# Patient Record
Sex: MALE | Race: WHITE | NOT HISPANIC OR LATINO | Employment: FULL TIME | URBAN - METROPOLITAN AREA
[De-identification: names, ages, dates, MRNs, and addresses within clinical notes are randomized per-mention and may not be internally consistent; named-entity substitution may affect disease eponyms.]

---

## 2021-11-20 ENCOUNTER — TRANSFERRED RECORDS (OUTPATIENT)
Dept: HEALTH INFORMATION MANAGEMENT | Facility: CLINIC | Age: 46
End: 2021-11-20

## 2021-11-20 ENCOUNTER — HOSPITAL ENCOUNTER (EMERGENCY)
Facility: CLINIC | Age: 46
Discharge: ANOTHER HOSPITAL | End: 2021-11-20
Attending: NURSE PRACTITIONER | Admitting: NURSE PRACTITIONER
Payer: COMMERCIAL

## 2021-11-20 ENCOUNTER — APPOINTMENT (OUTPATIENT)
Dept: CT IMAGING | Facility: CLINIC | Age: 46
End: 2021-11-20
Attending: NURSE PRACTITIONER
Payer: COMMERCIAL

## 2021-11-20 VITALS
RESPIRATION RATE: 28 BRPM | WEIGHT: 315 LBS | SYSTOLIC BLOOD PRESSURE: 137 MMHG | HEIGHT: 70 IN | HEART RATE: 65 BPM | DIASTOLIC BLOOD PRESSURE: 77 MMHG | OXYGEN SATURATION: 93 % | BODY MASS INDEX: 45.1 KG/M2 | TEMPERATURE: 98.9 F

## 2021-11-20 DIAGNOSIS — J12.82 PNEUMONIA DUE TO 2019 NOVEL CORONAVIRUS: ICD-10-CM

## 2021-11-20 DIAGNOSIS — U07.1 PNEUMONIA DUE TO 2019 NOVEL CORONAVIRUS: ICD-10-CM

## 2021-11-20 DIAGNOSIS — J96.01 ACUTE RESPIRATORY FAILURE WITH HYPOXIA (H): ICD-10-CM

## 2021-11-20 LAB
ALBUMIN SERPL-MCNC: 2.6 G/DL (ref 3.4–5)
ALP SERPL-CCNC: 48 U/L (ref 40–150)
ALT SERPL W P-5'-P-CCNC: 56 U/L (ref 0–70)
ANION GAP SERPL CALCULATED.3IONS-SCNC: 4 MMOL/L (ref 3–14)
AST SERPL W P-5'-P-CCNC: 31 U/L (ref 0–45)
BASE EXCESS BLDV CALC-SCNC: 6.5 MMOL/L (ref -7.7–1.9)
BASOPHILS # BLD AUTO: 0 10E3/UL (ref 0–0.2)
BASOPHILS NFR BLD AUTO: 0 %
BILIRUB SERPL-MCNC: 0.4 MG/DL (ref 0.2–1.3)
BUN SERPL-MCNC: 25 MG/DL (ref 7–30)
CALCIUM SERPL-MCNC: 7.7 MG/DL (ref 8.5–10.1)
CHLORIDE BLD-SCNC: 104 MMOL/L (ref 94–109)
CO2 SERPL-SCNC: 29 MMOL/L (ref 20–32)
CREAT SERPL-MCNC: 0.88 MG/DL (ref 0.66–1.25)
CRP SERPL-MCNC: 86 MG/L (ref 0–8)
D DIMER PPP FEU-MCNC: 0.59 UG/ML FEU (ref 0–0.5)
EOSINOPHIL # BLD AUTO: 0 10E3/UL (ref 0–0.7)
EOSINOPHIL NFR BLD AUTO: 0 %
ERYTHROCYTE [DISTWIDTH] IN BLOOD BY AUTOMATED COUNT: 12.4 % (ref 10–15)
FERRITIN SERPL-MCNC: 2571 NG/ML (ref 26–388)
GFR SERPL CREATININE-BSD FRML MDRD: >90 ML/MIN/1.73M2
GLUCOSE BLD-MCNC: 214 MG/DL (ref 70–99)
HCO3 BLDV-SCNC: 32 MMOL/L (ref 21–28)
HCT VFR BLD AUTO: 41.3 % (ref 40–53)
HGB BLD-MCNC: 14 G/DL (ref 13.3–17.7)
IMM GRANULOCYTES # BLD: 0.2 10E3/UL
IMM GRANULOCYTES NFR BLD: 1 %
INR PPP: 1.13 (ref 0.85–1.15)
LACTATE SERPL-SCNC: 2 MMOL/L (ref 0.7–2)
LYMPHOCYTES # BLD AUTO: 1.1 10E3/UL (ref 0.8–5.3)
LYMPHOCYTES NFR BLD AUTO: 7 %
MCH RBC QN AUTO: 30 PG (ref 26.5–33)
MCHC RBC AUTO-ENTMCNC: 33.9 G/DL (ref 31.5–36.5)
MCV RBC AUTO: 89 FL (ref 78–100)
MONOCYTES # BLD AUTO: 0.4 10E3/UL (ref 0–1.3)
MONOCYTES NFR BLD AUTO: 3 %
NEUTROPHILS # BLD AUTO: 15.2 10E3/UL (ref 1.6–8.3)
NEUTROPHILS NFR BLD AUTO: 89 %
NRBC # BLD AUTO: 0 10E3/UL
NRBC BLD AUTO-RTO: 0 /100
O2/TOTAL GAS SETTING VFR VENT: 37 %
PCO2 BLDV: 47 MM HG (ref 40–50)
PH BLDV: 7.44 [PH] (ref 7.32–7.43)
PLATELET # BLD AUTO: 204 10E3/UL (ref 150–450)
PO2 BLDV: 27 MM HG (ref 25–47)
POTASSIUM BLD-SCNC: 4.2 MMOL/L (ref 3.4–5.3)
PROCALCITONIN SERPL-MCNC: 0.08 NG/ML
PROT SERPL-MCNC: 6.5 G/DL (ref 6.8–8.8)
RADIOLOGIST FLAGS: ABNORMAL
RBC # BLD AUTO: 4.66 10E6/UL (ref 4.4–5.9)
SODIUM SERPL-SCNC: 137 MMOL/L (ref 133–144)
TROPONIN I SERPL-MCNC: <0.015 UG/L (ref 0–0.04)
WBC # BLD AUTO: 17 10E3/UL (ref 4–11)

## 2021-11-20 PROCEDURE — 96365 THER/PROPH/DIAG IV INF INIT: CPT | Performed by: NURSE PRACTITIONER

## 2021-11-20 PROCEDURE — 84145 PROCALCITONIN (PCT): CPT | Performed by: NURSE PRACTITIONER

## 2021-11-20 PROCEDURE — 93010 ELECTROCARDIOGRAM REPORT: CPT | Performed by: NURSE PRACTITIONER

## 2021-11-20 PROCEDURE — 36415 COLL VENOUS BLD VENIPUNCTURE: CPT | Performed by: NURSE PRACTITIONER

## 2021-11-20 PROCEDURE — 80053 COMPREHEN METABOLIC PANEL: CPT | Performed by: NURSE PRACTITIONER

## 2021-11-20 PROCEDURE — 250N000013 HC RX MED GY IP 250 OP 250 PS 637: Performed by: NURSE PRACTITIONER

## 2021-11-20 PROCEDURE — 82728 ASSAY OF FERRITIN: CPT | Performed by: NURSE PRACTITIONER

## 2021-11-20 PROCEDURE — 83605 ASSAY OF LACTIC ACID: CPT | Performed by: NURSE PRACTITIONER

## 2021-11-20 PROCEDURE — 82803 BLOOD GASES ANY COMBINATION: CPT | Performed by: NURSE PRACTITIONER

## 2021-11-20 PROCEDURE — 93005 ELECTROCARDIOGRAM TRACING: CPT | Performed by: NURSE PRACTITIONER

## 2021-11-20 PROCEDURE — 99285 EMERGENCY DEPT VISIT HI MDM: CPT | Mod: 25 | Performed by: NURSE PRACTITIONER

## 2021-11-20 PROCEDURE — 250N000011 HC RX IP 250 OP 636: Performed by: NURSE PRACTITIONER

## 2021-11-20 PROCEDURE — 85025 COMPLETE CBC W/AUTO DIFF WBC: CPT | Performed by: NURSE PRACTITIONER

## 2021-11-20 PROCEDURE — 96372 THER/PROPH/DIAG INJ SC/IM: CPT | Performed by: NURSE PRACTITIONER

## 2021-11-20 PROCEDURE — 71275 CT ANGIOGRAPHY CHEST: CPT

## 2021-11-20 PROCEDURE — 86140 C-REACTIVE PROTEIN: CPT | Performed by: NURSE PRACTITIONER

## 2021-11-20 PROCEDURE — 96361 HYDRATE IV INFUSION ADD-ON: CPT | Performed by: NURSE PRACTITIONER

## 2021-11-20 PROCEDURE — 84484 ASSAY OF TROPONIN QUANT: CPT | Performed by: NURSE PRACTITIONER

## 2021-11-20 PROCEDURE — 85379 FIBRIN DEGRADATION QUANT: CPT | Performed by: NURSE PRACTITIONER

## 2021-11-20 PROCEDURE — 85610 PROTHROMBIN TIME: CPT | Performed by: NURSE PRACTITIONER

## 2021-11-20 PROCEDURE — 250N000009 HC RX 250: Performed by: NURSE PRACTITIONER

## 2021-11-20 PROCEDURE — 258N000003 HC RX IP 258 OP 636: Performed by: NURSE PRACTITIONER

## 2021-11-20 RX ORDER — IBUPROFEN 600 MG/1
600 TABLET, FILM COATED ORAL EVERY 4 HOURS PRN
Status: DISCONTINUED | OUTPATIENT
Start: 2021-11-20 | End: 2021-11-20 | Stop reason: HOSPADM

## 2021-11-20 RX ORDER — ACETAMINOPHEN 325 MG/1
650 TABLET ORAL EVERY 4 HOURS PRN
Status: DISCONTINUED | OUTPATIENT
Start: 2021-11-20 | End: 2021-11-20 | Stop reason: HOSPADM

## 2021-11-20 RX ORDER — ACETAMINOPHEN 325 MG/1
650 TABLET ORAL ONCE
Status: COMPLETED | OUTPATIENT
Start: 2021-11-20 | End: 2021-11-20

## 2021-11-20 RX ORDER — DEXAMETHASONE 6 MG/1
6 TABLET ORAL 2 TIMES DAILY
COMMUNITY
Start: 2021-11-17 | End: 2023-04-28

## 2021-11-20 RX ORDER — DEXAMETHASONE SODIUM PHOSPHATE 10 MG/ML
6 INJECTION, SOLUTION INTRAMUSCULAR; INTRAVENOUS EVERY 24 HOURS
Status: DISCONTINUED | OUTPATIENT
Start: 2021-11-21 | End: 2021-11-20 | Stop reason: HOSPADM

## 2021-11-20 RX ORDER — IOPAMIDOL 755 MG/ML
100 INJECTION, SOLUTION INTRAVASCULAR ONCE
Status: COMPLETED | OUTPATIENT
Start: 2021-11-20 | End: 2021-11-20

## 2021-11-20 RX ADMIN — SODIUM CHLORIDE 85 ML: 9 INJECTION, SOLUTION INTRAVENOUS at 14:23

## 2021-11-20 RX ADMIN — IOPAMIDOL 100 ML: 755 INJECTION, SOLUTION INTRAVENOUS at 14:22

## 2021-11-20 RX ADMIN — ACETAMINOPHEN 650 MG: 325 TABLET, FILM COATED ORAL at 13:20

## 2021-11-20 RX ADMIN — SODIUM CHLORIDE 1000 ML: 9 INJECTION, SOLUTION INTRAVENOUS at 13:21

## 2021-11-20 RX ADMIN — REMDESIVIR 200 MG: 100 INJECTION, POWDER, LYOPHILIZED, FOR SOLUTION INTRAVENOUS at 16:44

## 2021-11-20 RX ADMIN — ENOXAPARIN SODIUM 40 MG: 40 INJECTION SUBCUTANEOUS at 16:45

## 2021-11-20 RX ADMIN — SODIUM CHLORIDE 50 ML: 9 INJECTION, SOLUTION INTRAVENOUS at 16:46

## 2021-11-20 ASSESSMENT — ENCOUNTER SYMPTOMS
HEADACHES: 1
MYALGIAS: 1
DIARRHEA: 1
VOMITING: 0
ABDOMINAL PAIN: 0
FEVER: 1
FATIGUE: 1
NAUSEA: 1
CHILLS: 1
APPETITE CHANGE: 1
SHORTNESS OF BREATH: 1
COUGH: 1

## 2021-11-20 NOTE — ED TRIAGE NOTES
Pt was covid positive on 11/16 and was sent home with oxygen at 2 L, he is having increased shortness of breath, was not able to do iv treatment because of oxygen use

## 2021-11-20 NOTE — ED PROVIDER NOTES
History     Chief Complaint   Patient presents with     Shortness of Breath     HPI  Clarence Stahl is a 45 year old male who presents for evaluation of increased shortness of breath.  Patient was seen 11/16/2021 at [place] ED, positive for Covid-19 and CXR showing evidence of covid-pneumonia with hypoxia. He was discharged on dexamethasone 6 mg twice a day for 7 days and oxygen at 2 L per nasal cannula.  Symptoms started on 11/11/2021 (9 days ago). He reports fever/chills, dry cough, congestion, decreased appetite, and intermittent nausea.  Over the last 24 hours he reports SPO2 88-90% despite oxygen at 2L at home, increased shortness of breath, and has been coughing up blood. Today he started having diarrhea.  Nonsmoker.  Drinks alcohol occasionally, none recently.  He is not vaccinated for covid.    Allergies:  No Known Allergies    Problem List:    There are no problems to display for this patient.       Past Medical History:    History reviewed. No pertinent past medical history.    Past Surgical History:    Past Surgical History:   Procedure Laterality Date     ORTHOPEDIC SURGERY         Family History:    History reviewed. No pertinent family history.    Social History:  Marital Status:   [2]  Social History     Tobacco Use     Smoking status: Never Smoker     Smokeless tobacco: Never Used   Substance Use Topics     Alcohol use: Not Currently     Drug use: Never        Medications:    Ascorbic Acid (VITAMIN C PO)  dexamethasone (DECADRON) 6 MG tablet      Review of Systems   Constitutional: Positive for appetite change, chills, fatigue and fever.   HENT: Positive for congestion.    Respiratory: Positive for cough and shortness of breath.    Cardiovascular: Negative for chest pain.   Gastrointestinal: Positive for diarrhea and nausea. Negative for abdominal pain and vomiting.   Genitourinary: Negative.    Musculoskeletal: Positive for myalgias.   Skin: Negative.    Neurological: Positive for  "headaches.   All other systems reviewed and are negative.    Physical Exam   BP: 120/71  Pulse: 84  Temp: 98.9  F (37.2  C)  Resp: 18  Height: 177.8 cm (5' 10\")  Weight: (!) 155.4 kg (342 lb 11.2 oz)  SpO2: (!) 89 %    Physical Exam  Constitutional:       General: He is in acute distress.      Appearance: He is obese. He is ill-appearing.   HENT:      Head: Normocephalic and atraumatic.      Right Ear: External ear normal.      Left Ear: External ear normal.      Nose: Congestion present.      Mouth/Throat:      Mouth: Mucous membranes are moist.   Eyes:      Conjunctiva/sclera: Conjunctivae normal.   Cardiovascular:      Rate and Rhythm: Normal rate and regular rhythm.      Heart sounds: No murmur heard.      Pulmonary:      Effort: Respiratory distress (SPO2 89% on arrival on oxygen 2L/per nasal canula) present.      Breath sounds: Examination of the right-lower field reveals decreased breath sounds. Examination of the left-lower field reveals decreased breath sounds. Decreased breath sounds present.   Musculoskeletal:         General: Normal range of motion.   Skin:     General: Skin is warm and dry.   Neurological:      General: No focal deficit present.      Mental Status: He is alert and oriented to person, place, and time.         ED Course        Procedures               EKG Interpretation:      Interpreted by ARMAND Carson CNP  Time reviewed:1325   Symptoms at time of EKG: short of breath   Rhythm: Normal sinus   Rate: Normal  Axis: Normal  Ectopy: None  Conduction: Normal  ST Segments/ T Waves: No ST-T wave changes and No acute ischemic changes  Q Waves: None  Comparison to prior: No old EKG available    Clinical Impression: normal EKG      Results for orders placed or performed during the hospital encounter of 11/20/21 (from the past 24 hour(s))   Lactic acid whole blood   Result Value Ref Range    Lactic Acid 2.0 0.7 - 2.0 mmol/L   Blood gas venous   Result Value Ref Range    pH Venous 7.44 " (H) 7.32 - 7.43    pCO2 Venous 47 40 - 50 mm Hg    pO2 Venous 27 25 - 47 mm Hg    Bicarbonate Venous 32 (H) 21 - 28 mmol/L    Base Excess/Deficit (+/-) 6.5 (H) -7.7 - 1.9 mmol/L    FIO2 37    CBC with platelets differential    Narrative    The following orders were created for panel order CBC with platelets differential.  Procedure                               Abnormality         Status                     ---------                               -----------         ------                     CBC with platelets and d...[[id number]]  Abnormal            Final result                 Please view results for these tests on the individual orders.   Comprehensive metabolic panel   Result Value Ref Range    Sodium 137 133 - 144 mmol/L    Potassium 4.2 3.4 - 5.3 mmol/L    Chloride 104 94 - 109 mmol/L    Carbon Dioxide (CO2) 29 20 - 32 mmol/L    Anion Gap 4 3 - 14 mmol/L    Urea Nitrogen 25 7 - 30 mg/dL    Creatinine 0.88 0.66 - 1.25 mg/dL    Calcium 7.7 (L) 8.5 - 10.1 mg/dL    Glucose 214 (H) 70 - 99 mg/dL    Alkaline Phosphatase 48 40 - 150 U/L    AST 31 0 - 45 U/L    ALT 56 0 - 70 U/L    Protein Total 6.5 (L) 6.8 - 8.8 g/dL    Albumin 2.6 (L) 3.4 - 5.0 g/dL    Bilirubin Total 0.4 0.2 - 1.3 mg/dL    GFR Estimate >90 >60 mL/min/1.73m2   D dimer quantitative   Result Value Ref Range    D-Dimer Quantitative 0.59 (H) 0.00 - 0.50 ug/mL FEU    Narrative    This D-dimer assay is intended for use in conjunction with a clinical pretest probability assessment model to exclude pulmonary embolism (PE) and deep venous thrombosis (DVT) in outpatients suspected of PE or DVT. The cut-off value is 0.50 ug/mL FEU.   Troponin I   Result Value Ref Range    Troponin I <0.015 0.000 - 0.045 ug/L   CRP inflammation   Result Value Ref Range    CRP Inflammation 86.0 (H) 0.0 - 8.0 mg/L   Ferritin   Result Value Ref Range    Ferritin 2,571 (H) 26 - 388 ng/mL   CBC with platelets and differential   Result Value Ref Range    WBC Count 17.0 (H) 4.0 -  11.0 10e3/uL    RBC Count 4.66 4.40 - 5.90 10e6/uL    Hemoglobin 14.0 13.3 - 17.7 g/dL    Hematocrit 41.3 40.0 - 53.0 %    MCV 89 78 - 100 fL    MCH 30.0 26.5 - 33.0 pg    MCHC 33.9 31.5 - 36.5 g/dL    RDW 12.4 10.0 - 15.0 %    Platelet Count 204 150 - 450 10e3/uL    % Neutrophils 89 %    % Lymphocytes 7 %    % Monocytes 3 %    % Eosinophils 0 %    % Basophils 0 %    % Immature Granulocytes 1 %    NRBCs per 100 WBC 0 <1 /100    Absolute Neutrophils 15.2 (H) 1.6 - 8.3 10e3/uL    Absolute Lymphocytes 1.1 0.8 - 5.3 10e3/uL    Absolute Monocytes 0.4 0.0 - 1.3 10e3/uL    Absolute Eosinophils 0.0 0.0 - 0.7 10e3/uL    Absolute Basophils 0.0 0.0 - 0.2 10e3/uL    Absolute Immature Granulocytes 0.2 (H) <=0.0 10e3/uL    Absolute NRBCs 0.0 10e3/uL   INR   Result Value Ref Range    INR 1.13 0.85 - 1.15   CT Chest Pulmonary Embolism w Contrast   Result Value Ref Range    Radiologist flags (Urgent)      Follow-up imaging in 4-6 weeks to assess the mediastinal lesion and the spleen.    Narrative    EXAM: CT CHEST PULMONARY EMBOLISM W CONTRAST  LOCATION: [facility]  DATE/TIME: 11/20/2021 1:59 PM    INDICATION: dyspnea, hypoxia, positive covid  COMPARISON: None.  TECHNIQUE: CT chest pulmonary angiogram during arterial phase injection of IV contrast. Multiplanar reformats and MIP reconstructions were performed. Dose reduction techniques were used.   CONTRAST: Isovue-370, 80mL    FINDINGS:  ANGIOGRAM CHEST: Pulmonary arteries are normal caliber and negative for central, lobar, or segmental pulmonary emboli. Subsegmental vessels are not well assessed due to motion and arm positioning. Thoracic aorta is negative for dissection.    LUNGS AND PLEURA: There are numerous nodular and more confluent opacities throughout the lungs.     MEDIASTINUM/AXILLAE: There is a 3.9 x 3.4 cm low-attenuation structure in the left subaortic distribution. There is a 12 mm right upper paratracheal lymph node.     CORONARY ARTERY  CALCIFICATION: Mild.    UPPER ABDOMEN: Splenomegaly.     MUSCULOSKELETAL: Normal.        Impression    IMPRESSION:  1.  No significant PE.  2.  Commonly reported imaging features of COVID 19 pneumonia are present. Other processes can have a similar imaging appearance.  3.  Significant enlarged left mediastinal structure possibly representing a lymph node. There is a mildly enlarged right upper paratracheal lymph node. Recommend follow-up imaging in 4-6 weeks.  4.  Splenomegaly is nonspecific. This can be assessed at the time of follow-up.      [Access Center: Follow-up imaging in 4-6 weeks to assess the mediastinal lesion and the spleen. ]    This report will be copied to the [facility] to ensure a provider acknowledges the finding. Access Center is available Monday through Friday 8am-3:30 pm.          Medications   remdesivir (VEKURY) 100 mg in 0.9% NaCl 250 mL intermittent infusion SOLN 100 mg (has no administration in time range)     And   0.9% sodium chloride BOLUS (has no administration in time range)   enoxaparin ANTICOAGULANT (LOVENOX) injection 40 mg (40 mg Subcutaneous Given 11/20/21 1645)   acetaminophen (TYLENOL) tablet 650 mg (has no administration in time range)   ibuprofen (ADVIL/MOTRIN) tablet 600 mg (has no administration in time range)   dexamethasone PF (DECADRON) injection 6 mg (has no administration in time range)   0.9% sodium chloride BOLUS (0 mLs Intravenous Stopped 11/20/21 1727)   acetaminophen (TYLENOL) tablet 650 mg (650 mg Oral Given 11/20/21 1320)   iopamidol (ISOVUE-370) solution 100 mL (100 mLs Intravenous Given 11/20/21 1422)   sodium chloride 0.9 % bag 500mL for CT scan flush use (85 mLs As instructed Given 11/20/21 1423)   remdesivir 200 mg in sodium chloride 0.9 % 250 mL intermittent infusion (0 mg Intravenous Stopped 11/20/21 1727)     Followed by   0.9% sodium chloride BOLUS (0 mLs Intravenous Stopped 11/20/21 1727)       Assessments & Plan (with Medical Decision  Making)   Brief HPI:    45 year old male with covid pneumonia, symptoms started 11 days ago. He is not vaccinated for covid-19. Seen at [place] ED 11/16, he had hypoxia, CXR with evidence of covid pneumonia, and discharged home on oxygen at 2L and prescribed Dexamethasone 6 mg twice a day. Over the last 24 hours he has had increased shortness of breath and SPO2 88% despite supplemental oxygen.    Exam/Lab/Imaging findings:  Body mass index is 49.17 kg/m .    Patient is alert and oriented.  Ill-appearing.  Afebrile.  Normotensive.  No tachycardia.  SPO2 89% on arrival in Triage while on oxygen 2L/per nasal canula.  Lung sounds diminished in bilateral bases.  EKG shows sinus tachycardia, no acute ischemic changes.  Pertinent lab and imaging findings:   WBC 17.0 (likely from being on steroid medication). Lactic acid is normal.   CRP 86.0  D-dimer 0.59  Normal electrolytes, normal kidney function, and normal LFTs.  Troponin is normal  Ferritin 2,571  INR 1.13  VBG obtained with patient on 4L per nasal canula.   Procalcitonin still pending.      ED Course/Procedures:    Patient's oxygen is at 4L per nasal canula with SPO2 91-92%.  No increased oxygen demands at this time.  He is remained vitally stable.  Patient took dexamethasone 6 mg p.o. at home this morning.  Here in the emergency department patient was given IV normal saline 1 L bolus, IV remdesivir 200 mg, and enoxaparin 40 mg subcutaneous.   There were no beds in the [facility] and we have been waiting for an inpatient bed to be available.    1716: Bed became available at [facility].  I spoke with Dr. Still hospitalist at [facility] who agrees to assume care of patient on transfer.      New Prescriptions    No medications on file       Final diagnoses:   Pneumonia due to 2019 novel coronavirus   Acute respiratory failure with hypoxia (H)       11/20/2021   [facility] EMERGENCY DEPT     Hanny,  ARMAND Reece CNP  11/20/21 1855       Leslie Gamino APRN CNP  11/20/21 1851

## 2021-11-26 LAB
ALT SERPL-CCNC: 86 IU/L (ref 8–45)
AST SERPL-CCNC: 20 IU/L (ref 2–40)
CREATININE (EXTERNAL): 1.15 MG/DL (ref 0.72–1.25)
GFR ESTIMATED (EXTERNAL): >60 ML/MIN/1.73M2
GFR ESTIMATED (IF AFRICAN AMERICAN) (EXTERNAL): >60 ML/MIN/1.73M2
GLUCOSE (EXTERNAL): 188 MG/DL (ref 65–100)
POTASSIUM (EXTERNAL): 4.4 MMOL/L (ref 3.5–5)

## 2022-01-09 ENCOUNTER — HEALTH MAINTENANCE LETTER (OUTPATIENT)
Age: 47
End: 2022-01-09

## 2022-11-21 ENCOUNTER — HEALTH MAINTENANCE LETTER (OUTPATIENT)
Age: 47
End: 2022-11-21

## 2023-04-15 ENCOUNTER — HOSPITAL ENCOUNTER (EMERGENCY)
Facility: CLINIC | Age: 48
Discharge: HOME | End: 2023-04-15
Attending: EMERGENCY MEDICINE | Admitting: EMERGENCY MEDICINE
Payer: COMMERCIAL

## 2023-04-15 VITALS
SYSTOLIC BLOOD PRESSURE: 145 MMHG | WEIGHT: 315 LBS | TEMPERATURE: 97.9 F | DIASTOLIC BLOOD PRESSURE: 95 MMHG | BODY MASS INDEX: 45.1 KG/M2 | OXYGEN SATURATION: 96 % | HEIGHT: 70 IN | RESPIRATION RATE: 20 BRPM | HEART RATE: 82 BPM

## 2023-04-15 DIAGNOSIS — J02.9 PHARYNGITIS, UNSPECIFIED ETIOLOGY: ICD-10-CM

## 2023-04-15 LAB
DEPRECATED S PYO AG THROAT QL EIA: NEGATIVE
GROUP A STREP BY PCR: NOT DETECTED

## 2023-04-15 PROCEDURE — 99283 EMERGENCY DEPT VISIT LOW MDM: CPT | Performed by: EMERGENCY MEDICINE

## 2023-04-15 PROCEDURE — 250N000012 HC RX MED GY IP 250 OP 636 PS 637: Performed by: EMERGENCY MEDICINE

## 2023-04-15 PROCEDURE — 87651 STREP A DNA AMP PROBE: CPT | Performed by: EMERGENCY MEDICINE

## 2023-04-15 RX ADMIN — DEXAMETHASONE 10 MG: 2 TABLET ORAL at 19:17

## 2023-04-15 ASSESSMENT — ACTIVITIES OF DAILY LIVING (ADL): ADLS_ACUITY_SCORE: 33

## 2023-04-15 NOTE — ED TRIAGE NOTES
Has had a sore throat since Thursday.      Triage Assessment     Row Name 04/15/23 [id number]       Triage Assessment (Adult)    Airway WDL WDL       Respiratory WDL    Respiratory WDL WDL       Skin Circulation/Temperature WDL    Skin Circulation/Temperature WDL WDL       Cardiac WDL    Cardiac WDL WDL       Peripheral/Neurovascular WDL    Peripheral Neurovascular WDL WDL       Cognitive/Neuro/Behavioral WDL    Cognitive/Neuro/Behavioral WDL WDL

## 2023-04-16 ENCOUNTER — HEALTH MAINTENANCE LETTER (OUTPATIENT)
Age: 48
End: 2023-04-16

## 2023-04-16 NOTE — DISCHARGE INSTRUCTIONS
We have sent a culture to see if this is strep throat.  You will receive a call with a prescription for antibiotics if it is positive.  Otherwise, this is a virus and will go away on its own.  You can help with your symptoms by using ibuprofen, Tylenol, salt water gargles, and eating/drinking colder liquids.

## 2023-04-16 NOTE — ED PROVIDER NOTES
"    History     chief complaint  HPI  Patient is a 47-year-old gentleman who reports no contributory past medical history presenting with day 3 of a sore throat.  Occasionally feels slightly fatigued.  Also occasional slight headache.  His wife saw white spots on his tonsils so he came here.  No cough, rhinorrhea, vomiting, diarrhea, abdominal pain, chest pain, dyspnea.    Review of Systems:  All organ systems below were reviewed and are negative unless indicated in the HPI.    Constitutional  Eyes  ENT  Respiratory  Cardiovascular  Gastrointestinal  Genitourinary  Musculoskeletal  Skin  Neuro    Allergies:  No Known Allergies    Problem List:    There are no problems to display for this patient.       Past Medical History:    History reviewed. No pertinent past medical history.    Past Surgical History:    Past Surgical History:   Procedure Laterality Date     ORTHOPEDIC SURGERY         Family History:    No family history on file.    Medications:    Ascorbic Acid (VITAMIN C PO)  dexamethasone (DECADRON) 6 MG tablet          Physical Exam   BP: (!) 145/95  Pulse: 82  Temp: 97.9  F (36.6  C)  Resp: 20  Height: 177.8 cm (5' 10\")  Weight: (!) 153.4 kg (338 lb 1.6 oz)  SpO2: 96 %     Gen: Vital signs reviewed  Eyes: Sclera white, pupils round  ENT: External ears and nares normal, bilateral pharyngeal tonsillar swelling with exudates.  No peritonsillar abscess visualized.  Bilateral submandibular tender lymphadenopathy.  Card: Appears well-perfused  Resp: No respiratory distress.   Extremities: Without obvious deformity  Skin: Dry  Neuro: Alert.      ED Course        Procedures                Results for orders placed or performed during the hospital encounter of 04/15/23 (from the past 24 hour(s))   Streptococcus A Rapid Screen w/Reflex to PCR    Specimen: Throat; Swab   Result Value Ref Range    Group A Strep antigen Negative Negative       Medications   dexamethasone (DECADRON) tablet 10 mg (has no administration in " time range)         Consultations:  None    Social Determinants of Health:  , manages ADLs    Assessments & Plan (with Medical Decision Making)       I have reviewed the nursing notes.    I have reviewed the findings, diagnosis, plan and need for follow up with the patient.      Medical Decision Making  On arrival, vital signs show mild hypertension.  He has obvious pharyngitis on exam.  Differential includes viral pharyngitis versus streptococcal pharyngitis.  Rapid strep test is negative.  Culture sent.  Given Decadron for symptom relief.  Discussed appropriate return precautions; he will be notified if the culture is positive.  Otherwise I do expect this to resolve on its own.  Discharged home in stable condition.    Final diagnoses:   Pharyngitis, unspecified etiology         Shay Gage M.D.   [facility] Emergency Department     Shay Gage MD  04/15/23 1912

## 2023-04-28 ENCOUNTER — OFFICE VISIT (OUTPATIENT)
Dept: FAMILY MEDICINE | Facility: CLINIC | Age: 48
End: 2023-04-28
Payer: COMMERCIAL

## 2023-04-28 VITALS
SYSTOLIC BLOOD PRESSURE: 132 MMHG | OXYGEN SATURATION: 96 % | HEIGHT: 70 IN | BODY MASS INDEX: 45.1 KG/M2 | TEMPERATURE: 97.8 F | DIASTOLIC BLOOD PRESSURE: 84 MMHG | WEIGHT: 315 LBS | HEART RATE: 86 BPM | RESPIRATION RATE: 18 BRPM

## 2023-04-28 DIAGNOSIS — E11.9 TYPE 2 DIABETES MELLITUS WITHOUT COMPLICATION, WITHOUT LONG-TERM CURRENT USE OF INSULIN (H): ICD-10-CM

## 2023-04-28 DIAGNOSIS — R09.82 POSTNASAL DRIP: ICD-10-CM

## 2023-04-28 DIAGNOSIS — R21 FACIAL RASH: ICD-10-CM

## 2023-04-28 DIAGNOSIS — R07.0 THROAT PAIN: Primary | ICD-10-CM

## 2023-04-28 DIAGNOSIS — H11.433 CONJUNCTIVAL INJECTION, BILATERAL: ICD-10-CM

## 2023-04-28 PROBLEM — G56.23 CUBITAL TUNNEL SYNDROME OF BOTH UPPER EXTREMITIES: Status: ACTIVE | Noted: 2019-10-04

## 2023-04-28 PROBLEM — G56.03 CARPAL TUNNEL SYNDROME, BILATERAL: Status: ACTIVE | Noted: 2019-10-04

## 2023-04-28 PROBLEM — M77.02 MEDIAL EPICONDYLITIS, LEFT: Status: ACTIVE | Noted: 2019-10-04

## 2023-04-28 PROBLEM — M77.01 MEDIAL EPICONDYLITIS OF RIGHT ELBOW: Status: ACTIVE | Noted: 2019-10-04

## 2023-04-28 LAB
BASOPHILS # BLD AUTO: 0 10E3/UL (ref 0–0.2)
BASOPHILS NFR BLD AUTO: 0 %
CRP SERPL-MCNC: 21 MG/L
DEPRECATED S PYO AG THROAT QL EIA: NEGATIVE
EOSINOPHIL # BLD AUTO: 0.1 10E3/UL (ref 0–0.7)
EOSINOPHIL NFR BLD AUTO: 1 %
ERYTHROCYTE [DISTWIDTH] IN BLOOD BY AUTOMATED COUNT: 11.7 % (ref 10–15)
ERYTHROCYTE [SEDIMENTATION RATE] IN BLOOD BY WESTERGREN METHOD: 7 MM/HR (ref 0–15)
GROUP A STREP BY PCR: NOT DETECTED
HCT VFR BLD AUTO: 45.7 % (ref 40–53)
HGB BLD-MCNC: 15.8 G/DL (ref 13.3–17.7)
IMM GRANULOCYTES # BLD: 0 10E3/UL
IMM GRANULOCYTES NFR BLD: 0 %
LYMPHOCYTES # BLD AUTO: 1.7 10E3/UL (ref 0.8–5.3)
LYMPHOCYTES NFR BLD AUTO: 18 %
MCH RBC QN AUTO: 29.1 PG (ref 26.5–33)
MCHC RBC AUTO-ENTMCNC: 34.6 G/DL (ref 31.5–36.5)
MCV RBC AUTO: 84 FL (ref 78–100)
MONOCYTES # BLD AUTO: 0.8 10E3/UL (ref 0–1.3)
MONOCYTES NFR BLD AUTO: 9 %
NEUTROPHILS # BLD AUTO: 6.8 10E3/UL (ref 1.6–8.3)
NEUTROPHILS NFR BLD AUTO: 72 %
PLATELET # BLD AUTO: 146 10E3/UL (ref 150–450)
RBC # BLD AUTO: 5.43 10E6/UL (ref 4.4–5.9)
WBC # BLD AUTO: 9.5 10E3/UL (ref 4–11)

## 2023-04-28 PROCEDURE — 85025 COMPLETE CBC W/AUTO DIFF WBC: CPT | Performed by: STUDENT IN AN ORGANIZED HEALTH CARE EDUCATION/TRAINING PROGRAM

## 2023-04-28 PROCEDURE — 99203 OFFICE O/P NEW LOW 30 MIN: CPT | Performed by: STUDENT IN AN ORGANIZED HEALTH CARE EDUCATION/TRAINING PROGRAM

## 2023-04-28 PROCEDURE — 36415 COLL VENOUS BLD VENIPUNCTURE: CPT | Performed by: STUDENT IN AN ORGANIZED HEALTH CARE EDUCATION/TRAINING PROGRAM

## 2023-04-28 PROCEDURE — 85652 RBC SED RATE AUTOMATED: CPT | Performed by: STUDENT IN AN ORGANIZED HEALTH CARE EDUCATION/TRAINING PROGRAM

## 2023-04-28 PROCEDURE — 86140 C-REACTIVE PROTEIN: CPT | Performed by: STUDENT IN AN ORGANIZED HEALTH CARE EDUCATION/TRAINING PROGRAM

## 2023-04-28 PROCEDURE — 87651 STREP A DNA AMP PROBE: CPT | Performed by: STUDENT IN AN ORGANIZED HEALTH CARE EDUCATION/TRAINING PROGRAM

## 2023-04-28 RX ORDER — FLUTICASONE PROPIONATE 50 MCG
1-2 SPRAY, SUSPENSION (ML) NASAL AT BEDTIME
Qty: 15.8 ML | Refills: 3 | Status: SHIPPED | OUTPATIENT
Start: 2023-04-28 | End: 2023-05-02

## 2023-04-28 RX ORDER — OMEGA-3 FATTY ACIDS/FISH OIL 300-1000MG
200 CAPSULE ORAL EVERY 4 HOURS PRN
COMMUNITY
End: 2023-05-02

## 2023-04-28 RX ORDER — TIRZEPATIDE 10 MG/.5ML
INJECTION, SOLUTION SUBCUTANEOUS
COMMUNITY
Start: 2023-04-21

## 2023-04-28 ASSESSMENT — PAIN SCALES - GENERAL: PAINLEVEL: NO PAIN (0)

## 2023-04-28 NOTE — PATIENT INSTRUCTIONS
Try the flonase for the next week. If things aren't significantly better, then  Pepcid (famotidine) over the counter and take 1 tablet once or twice a day.

## 2023-04-28 NOTE — PROGRESS NOTES
Assessment & Plan     Patient is a very pleasant 47-year-old gentleman with past history of type 2 diabetes relatively recently started on Mounjaro who presents today for 3 to 4 weeks of sore throat.  He was seen in urgent care on 4/1, had negative strep testing completed at that time.  He states he had a little bit of improvement in his sore throat, but over the last day or so it has worsened again.  With any This is not associated with any fevers, cough, or other systemic symptoms.  Does not have significant rhinorrhea.  Does feel like his throat is a little bit swollen.  On exam today, he continues to have posterior oropharyngeal erythema with some exudate, tonsils are equal bilaterally with midline uvula.  Does have palpable cervical adenopathy, and no unilateral swelling.  Sore throat is not incredibly severe nor does he have a fever, so I do not have concern for abscess at this time.    For work-up today, we did repeat the strep test.  Rapid strep was negative.  We also obtain inflammatory markers and CBC today, in particular to check the white blood cell count.  Etiology of his sore throat is still unknown.  He does have increased heartburn after starting Mounjaro, see below, as well has some signs of postnasal drip, so either these could certainly be the etiology of his symptoms.  Viral versus bacterial infection not ruled out at this time either.    Throat pain  - Streptococcus A Rapid Screen w/Reflex to PCR - Clinic Collect  - Group A Streptococcus PCR Throat Swab  - CRP inflammation  - Erythrocyte sedimentation rate auto  - CBC with platelets and differential    Conjunctival injection, bilateral  Given some conjunctival injection, CBC completed, especially to check on platelets. Reassuringly, though slightly low, these returned only mildly low and no concerns for ITP at this time.   - CBC with platelets and differential    Facial rash  Erythema with some dryness of central forehead, extending down  "lateral to nose both sides in malar rash distribution. Has been using castor oil which has been helping somewhat. Etiology of this rash is unknown at this time, though could be autoimmune vs allergic. Can continue supportive cares at this time and below workup is ordered.   - CRP inflammation  - Erythrocyte sedimentation rate auto  - CBC with platelets and differential    Postnasal drip  Does have some evidence of postnasal drip so will trial flonase for treatment of throat discomfort.   - fluticasone (FLONASE) 50 MCG/ACT nasal spray  Dispense: 15.8 mL; Refill: 3    Type 2 diabetes mellitus without complication, without long-term current use of insulin (H)  Patient has been taking Mounjaro, started in January. Just increased to the highest dose last week. Considered Mounjaro as possible cause of symptoms, though none are reported symptoms. Does have increased heartburn since starting Mounjaro, which indirectly could be causing his throat discomfort. We discussed trial of Pepcid if Flonase as above doesn't resolve symptoms.     I spent a total of 40 minutes on the day of the visit.   Time spent by me doing chart review, history and exam, documentation and further activities per the note     BMI:   Estimated body mass index is 47.78 kg/m  as calculated from the following:    Height as of this encounter: 1.778 m (5' 10\").    Weight as of this encounter: 151 kg (333 lb).     Felicia Ochoa MD  [facility]    Meghan Lima is a 47 year old, presenting for the following health issues:  Chief Complaint   Patient presents with     Pharyngitis     Derm Problem     On forehead, nose     History of Present Illness       Reason for visit:  Sore throat  Symptom onset:  3-4 weeks ago  Symptom intensity:  Moderate  Symptom progression:  Worsening  Had these symptoms before:  No  What makes it worse:  No  What makes it better:  Cold    He eats 2-3 servings of fruits and vegetables daily.He consumes " "0 sweetened beverage(s) daily.He exercises with enough effort to increase his heart rate 9 or less minutes per day.  He exercises with enough effort to increase his heart rate 3 or less days per week.   He is taking medications regularly.     Sore throat about a month ago.   Used cough drops  At least 3 weeks since  visit  Had white lumps on back of throat.   Strep test negative.   Was given steroid at  - does feel like it helped.     Using cough drops.     Had some red blotches on cheeks and up onto forehead. Feels dry yet on forehead. Wife is having him put castor oil on it the last few nights. Was pretty red previously. This rash about the same time frame.     Seemed like it was getting better but it is back now.   No fevers, chills.  Yesterday a little increased phlegm, no cough.   Does get headaches.   Not a ton of sinus pressure/pain.   For over a year since covid (had it bad), has had issues with breathing through nose.     Nobody else been sick at home. Some people at work have had similar symptoms. No known positives for strep/covid.     No personal history of seasonal allergies.     Mom might have psoriasis.     Does have some issues with heartburn. Takes tums PRN. Since starting mounjaro, when has a beer or two on weekends, that causes heartburn.       Review of Systems   Constitutional, HEENT, cardiovascular, pulmonary, GI, , musculoskeletal, neuro, skin, endocrine and psych systems are negative, except as otherwise noted.      Objective    /84 (BP Location: Right arm, Patient Position: Sitting, Cuff Size: Adult Large)   Pulse 86   Temp 97.8  F (36.6  C) (Tympanic)   Resp 18   Ht 1.778 m (5' 10\")   Wt (!) 151 kg (333 lb)   SpO2 96%   BMI 47.78 kg/m    Body mass index is 47.78 kg/m .  Physical Exam  Constitutional:       Appearance: Normal appearance.   HENT:      Head: Normocephalic.        Right Ear: A middle ear effusion is present.      Left Ear: A middle ear effusion is present.    "   Nose: Congestion present.      Mouth/Throat:      Pharynx: Uvula midline. Oropharyngeal exudate and posterior oropharyngeal erythema present.      Tonsils: No tonsillar abscesses. 1+ on the right. 1+ on the left.   Eyes:      General: No scleral icterus.     Extraocular Movements: Extraocular movements intact.      Conjunctiva/sclera: Conjunctivae normal.   Cardiovascular:      Rate and Rhythm: Normal rate.   Pulmonary:      Effort: Pulmonary effort is normal.   Musculoskeletal:         General: Normal range of motion.      Cervical back: Normal range of motion.   Lymphadenopathy:      Cervical: Cervical adenopathy present.   Neurological:      General: No focal deficit present.      Mental Status: He is alert and oriented to person, place, and time.         Results from this visit  Results for orders placed or performed in visit on 04/28/23   CBC with platelets and differential     Status: Abnormal   Result Value Ref Range    WBC Count 9.5 4.0 - 11.0 10e3/uL    RBC Count 5.43 4.40 - 5.90 10e6/uL    Hemoglobin 15.8 13.3 - 17.7 g/dL    Hematocrit 45.7 40.0 - 53.0 %    MCV 84 78 - 100 fL    MCH 29.1 26.5 - 33.0 pg    MCHC 34.6 31.5 - 36.5 g/dL    RDW 11.7 10.0 - 15.0 %    Platelet Count 146 (L) 150 - 450 10e3/uL    % Neutrophils 72 %    % Lymphocytes 18 %    % Monocytes 9 %    % Eosinophils 1 %    % Basophils 0 %    % Immature Granulocytes 0 %    Absolute Neutrophils 6.8 1.6 - 8.3 10e3/uL    Absolute Lymphocytes 1.7 0.8 - 5.3 10e3/uL    Absolute Monocytes 0.8 0.0 - 1.3 10e3/uL    Absolute Eosinophils 0.1 0.0 - 0.7 10e3/uL    Absolute Basophils 0.0 0.0 - 0.2 10e3/uL    Absolute Immature Granulocytes 0.0 <=0.4 10e3/uL   Streptococcus A Rapid Screen w/Reflex to PCR - Clinic Collect     Status: Normal    Specimen: Throat; Swab   Result Value Ref Range    Group A Strep antigen Negative Negative   CBC with platelets and differential     Status: Abnormal    Narrative    The following orders were created for panel order CBC  with platelets and differential.  Procedure                               Abnormality         Status                     ---------                               -----------         ------                     CBC with platelets and d...[[id number]]  Abnormal            Final result                 Please view results for these tests on the individual orders.

## 2023-04-29 ENCOUNTER — MYC MEDICAL ADVICE (OUTPATIENT)
Dept: FAMILY MEDICINE | Facility: CLINIC | Age: 48
End: 2023-04-29
Payer: COMMERCIAL

## 2023-04-29 DIAGNOSIS — N17.9 AKI (ACUTE KIDNEY INJURY) (H): ICD-10-CM

## 2023-04-29 DIAGNOSIS — J01.90 ACUTE BACTERIAL SINUSITIS: Primary | ICD-10-CM

## 2023-04-29 DIAGNOSIS — B96.89 ACUTE BACTERIAL SINUSITIS: Primary | ICD-10-CM

## 2023-05-01 RX ORDER — AMOXICILLIN 875 MG
875 TABLET ORAL 2 TIMES DAILY
Qty: 14 TABLET | Refills: 0 | Status: SHIPPED | OUTPATIENT
Start: 2023-05-01 | End: 2023-05-08

## 2023-05-02 ENCOUNTER — APPOINTMENT (OUTPATIENT)
Dept: CT IMAGING | Facility: CLINIC | Age: 48
End: 2023-05-02
Attending: EMERGENCY MEDICINE
Payer: COMMERCIAL

## 2023-05-02 ENCOUNTER — HOSPITAL ENCOUNTER (EMERGENCY)
Facility: CLINIC | Age: 48
Discharge: HOME | End: 2023-05-02
Attending: EMERGENCY MEDICINE | Admitting: EMERGENCY MEDICINE
Payer: COMMERCIAL

## 2023-05-02 VITALS
OXYGEN SATURATION: 98 % | SYSTOLIC BLOOD PRESSURE: 136 MMHG | BODY MASS INDEX: 48.01 KG/M2 | HEART RATE: 77 BPM | DIASTOLIC BLOOD PRESSURE: 91 MMHG | RESPIRATION RATE: 16 BRPM | WEIGHT: 315 LBS | TEMPERATURE: 98.1 F

## 2023-05-02 DIAGNOSIS — J03.90 ACUTE TONSILLITIS, UNSPECIFIED ETIOLOGY: ICD-10-CM

## 2023-05-02 LAB
ALBUMIN SERPL BCG-MCNC: 4 G/DL (ref 3.5–5.2)
ALP SERPL-CCNC: 87 U/L (ref 40–129)
ALT SERPL W P-5'-P-CCNC: 23 U/L (ref 10–50)
ANION GAP SERPL CALCULATED.3IONS-SCNC: 9 MMOL/L (ref 7–15)
AST SERPL W P-5'-P-CCNC: 13 U/L (ref 10–50)
BASOPHILS # BLD MANUAL: 0 10E3/UL (ref 0–0.2)
BASOPHILS NFR BLD MANUAL: 0 %
BILIRUB SERPL-MCNC: 0.3 MG/DL
BUN SERPL-MCNC: 24.4 MG/DL (ref 6–20)
CALCIUM SERPL-MCNC: 8.9 MG/DL (ref 8.6–10)
CHLORIDE SERPL-SCNC: 100 MMOL/L (ref 98–107)
CREAT SERPL-MCNC: 1.22 MG/DL (ref 0.67–1.17)
DEPRECATED HCO3 PLAS-SCNC: 30 MMOL/L (ref 22–29)
EOSINOPHIL # BLD MANUAL: 0 10E3/UL (ref 0–0.7)
EOSINOPHIL NFR BLD MANUAL: 0 %
ERYTHROCYTE [DISTWIDTH] IN BLOOD BY AUTOMATED COUNT: 11.9 % (ref 10–15)
ERYTHROCYTE [SEDIMENTATION RATE] IN BLOOD BY WESTERGREN METHOD: 9 MM/HR (ref 0–15)
FLUAV RNA SPEC QL NAA+PROBE: NEGATIVE
FLUBV RNA RESP QL NAA+PROBE: NEGATIVE
GFR SERPL CREATININE-BSD FRML MDRD: 74 ML/MIN/1.73M2
GLUCOSE SERPL-MCNC: 93 MG/DL (ref 70–99)
HCT VFR BLD AUTO: 44 % (ref 40–53)
HGB BLD-MCNC: 15.1 G/DL (ref 13.3–17.7)
LYMPHOCYTES # BLD MANUAL: 4.6 10E3/UL (ref 0.8–5.3)
LYMPHOCYTES NFR BLD MANUAL: 43 %
MCH RBC QN AUTO: 29.4 PG (ref 26.5–33)
MCHC RBC AUTO-ENTMCNC: 34.3 G/DL (ref 31.5–36.5)
MCV RBC AUTO: 86 FL (ref 78–100)
MONOCYTES # BLD MANUAL: 0.5 10E3/UL (ref 0–1.3)
MONOCYTES NFR BLD AUTO: NEGATIVE %
MONOCYTES NFR BLD MANUAL: 5 %
NEUTROPHILS # BLD MANUAL: 5.6 10E3/UL (ref 1.6–8.3)
NEUTROPHILS NFR BLD MANUAL: 52 %
PLAT MORPH BLD: ABNORMAL
PLATELET # BLD AUTO: 192 10E3/UL (ref 150–450)
POTASSIUM SERPL-SCNC: 4.4 MMOL/L (ref 3.4–5.3)
PROT SERPL-MCNC: 7 G/DL (ref 6.4–8.3)
RBC # BLD AUTO: 5.13 10E6/UL (ref 4.4–5.9)
RBC MORPH BLD: ABNORMAL
RSV RNA SPEC NAA+PROBE: NEGATIVE
SARS-COV-2 RNA RESP QL NAA+PROBE: NEGATIVE
SODIUM SERPL-SCNC: 139 MMOL/L (ref 136–145)
VARIANT LYMPHS BLD QL SMEAR: PRESENT
WBC # BLD AUTO: 10.8 10E3/UL (ref 4–11)

## 2023-05-02 PROCEDURE — 96375 TX/PRO/DX INJ NEW DRUG ADDON: CPT

## 2023-05-02 PROCEDURE — 258N000003 HC RX IP 258 OP 636: Performed by: EMERGENCY MEDICINE

## 2023-05-02 PROCEDURE — 86308 HETEROPHILE ANTIBODY SCREEN: CPT | Performed by: EMERGENCY MEDICINE

## 2023-05-02 PROCEDURE — 250N000011 HC RX IP 250 OP 636: Performed by: EMERGENCY MEDICINE

## 2023-05-02 PROCEDURE — 85652 RBC SED RATE AUTOMATED: CPT | Performed by: EMERGENCY MEDICINE

## 2023-05-02 PROCEDURE — 85014 HEMATOCRIT: CPT | Performed by: EMERGENCY MEDICINE

## 2023-05-02 PROCEDURE — 70491 CT SOFT TISSUE NECK W/DYE: CPT

## 2023-05-02 PROCEDURE — 96374 THER/PROPH/DIAG INJ IV PUSH: CPT | Mod: 59

## 2023-05-02 PROCEDURE — 85007 BL SMEAR W/DIFF WBC COUNT: CPT | Performed by: EMERGENCY MEDICINE

## 2023-05-02 PROCEDURE — 96361 HYDRATE IV INFUSION ADD-ON: CPT

## 2023-05-02 PROCEDURE — 99285 EMERGENCY DEPT VISIT HI MDM: CPT | Mod: 25 | Performed by: EMERGENCY MEDICINE

## 2023-05-02 PROCEDURE — C9803 HOPD COVID-19 SPEC COLLECT: HCPCS

## 2023-05-02 PROCEDURE — 99285 EMERGENCY DEPT VISIT HI MDM: CPT | Mod: 25,CS

## 2023-05-02 PROCEDURE — 250N000009 HC RX 250: Performed by: EMERGENCY MEDICINE

## 2023-05-02 PROCEDURE — 87637 SARSCOV2&INF A&B&RSV AMP PRB: CPT | Performed by: EMERGENCY MEDICINE

## 2023-05-02 PROCEDURE — 80053 COMPREHEN METABOLIC PANEL: CPT | Performed by: EMERGENCY MEDICINE

## 2023-05-02 PROCEDURE — 36415 COLL VENOUS BLD VENIPUNCTURE: CPT | Performed by: EMERGENCY MEDICINE

## 2023-05-02 RX ORDER — SODIUM CHLORIDE 9 MG/ML
INJECTION, SOLUTION INTRAVENOUS CONTINUOUS
Status: DISCONTINUED | OUTPATIENT
Start: 2023-05-02 | End: 2023-05-02 | Stop reason: HOSPADM

## 2023-05-02 RX ORDER — HYDROMORPHONE HYDROCHLORIDE 1 MG/ML
0.5 INJECTION, SOLUTION INTRAMUSCULAR; INTRAVENOUS; SUBCUTANEOUS
Status: DISCONTINUED | OUTPATIENT
Start: 2023-05-02 | End: 2023-05-02 | Stop reason: HOSPADM

## 2023-05-02 RX ORDER — KETOROLAC TROMETHAMINE 15 MG/ML
15 INJECTION, SOLUTION INTRAMUSCULAR; INTRAVENOUS ONCE
Status: COMPLETED | OUTPATIENT
Start: 2023-05-02 | End: 2023-05-02

## 2023-05-02 RX ORDER — IBUPROFEN 200 MG
800 TABLET ORAL EVERY 4 HOURS PRN
COMMUNITY
End: 2023-06-09

## 2023-05-02 RX ORDER — CLINDAMYCIN HCL 300 MG
300 CAPSULE ORAL 4 TIMES DAILY
Qty: 40 CAPSULE | Refills: 0 | Status: SHIPPED | OUTPATIENT
Start: 2023-05-02 | End: 2023-05-12

## 2023-05-02 RX ORDER — DEXAMETHASONE SODIUM PHOSPHATE 10 MG/ML
6 INJECTION, SOLUTION INTRAMUSCULAR; INTRAVENOUS ONCE
Status: COMPLETED | OUTPATIENT
Start: 2023-05-02 | End: 2023-05-02

## 2023-05-02 RX ORDER — ONDANSETRON 2 MG/ML
4 INJECTION INTRAMUSCULAR; INTRAVENOUS EVERY 30 MIN PRN
Status: DISCONTINUED | OUTPATIENT
Start: 2023-05-02 | End: 2023-05-02 | Stop reason: HOSPADM

## 2023-05-02 RX ORDER — IOPAMIDOL 755 MG/ML
500 INJECTION, SOLUTION INTRAVASCULAR ONCE
Status: COMPLETED | OUTPATIENT
Start: 2023-05-02 | End: 2023-05-02

## 2023-05-02 RX ORDER — OXYCODONE HYDROCHLORIDE 5 MG/1
5 TABLET ORAL EVERY 6 HOURS PRN
Qty: 12 TABLET | Refills: 0 | Status: SHIPPED | OUTPATIENT
Start: 2023-05-02 | End: 2023-05-05

## 2023-05-02 RX ADMIN — SODIUM CHLORIDE 70 ML: 9 INJECTION, SOLUTION INTRAVENOUS at 17:09

## 2023-05-02 RX ADMIN — IOPAMIDOL 80 ML: 755 INJECTION, SOLUTION INTRAVENOUS at 17:09

## 2023-05-02 RX ADMIN — DEXAMETHASONE SODIUM PHOSPHATE 6 MG: 10 INJECTION, SOLUTION INTRAMUSCULAR; INTRAVENOUS at 16:50

## 2023-05-02 RX ADMIN — KETOROLAC TROMETHAMINE 15 MG: 15 INJECTION, SOLUTION INTRAMUSCULAR; INTRAVENOUS at 16:53

## 2023-05-02 RX ADMIN — SODIUM CHLORIDE 1000 ML: 9 INJECTION, SOLUTION INTRAVENOUS at 16:37

## 2023-05-02 RX ADMIN — HYDROMORPHONE HYDROCHLORIDE 0.5 MG: 1 INJECTION, SOLUTION INTRAMUSCULAR; INTRAVENOUS; SUBCUTANEOUS at 16:47

## 2023-05-02 ASSESSMENT — ACTIVITIES OF DAILY LIVING (ADL): ADLS_ACUITY_SCORE: 35

## 2023-05-02 NOTE — MEDICATION SCRIBE - ADMISSION MEDICATION HISTORY
Medication Scribe Admission Medication History    Admission medication history is complete. The information provided in this note is only as accurate as the sources available at the time of the update.    Medication reconciliation/reorder completed by provider prior to medication history? No    Information Source(s): Patient via in-person    Pertinent Information: Patient has taken 3 out of 14 doses of the Amoxicillin so far.    Changes made to PTA medication list:    Added: None    Deleted: Flonase    Changed: None    Medication Affordability:  Not including over the counter (OTC) medications, was there a time in the past 12 months when you did not take your medications as prescribed because of cost?: No    Allergies reviewed with patient and updates made in EHR: yes    Medication History Completed By: IMELDA KIRK 5/2/2023 6:00 PM    Prior to Admission medications    Medication Sig Last Dose Taking? Auth Provider Long Term End Date   amoxicillin (AMOXIL) 875 MG tablet Take 1 tablet (875 mg) by mouth 2 times daily for 7 days 5/2/2023 at am Yes Felicia Ochoa MD  5/8/23   Ascorbic Acid (VITAMIN C PO) Take 1 tablet by mouth daily 5/1/2023 at pm Yes Abstract, Provider     ibuprofen (ADVIL/MOTRIN) 200 MG tablet Take 800 mg by mouth every 4 hours as needed for pain 4/29/2023 at unknown Yes Reported, Patient     MOUNJARO 10 MG/0.5ML pen INJECT 10 MG SUBCUTANEOUSLY ONCE WEEKLY FOR 4 WEEKS 4/28/2023 at patient normally takes on Thursday but forgot and took on Friday Yes Reported, Patient

## 2023-05-02 NOTE — ED PROVIDER NOTES
History     Chief Complaint   Patient presents with     Throat Pain     HPI  Clarence Stahl is a 47 year old male who presents to the ER secondary to sore throat.  The sore throat started about 8 April.  He has been seen a couple of times and has had negative strep and negative strep culture.  He was started on antibiotics 1 day ago.  He was given a dose of steroids here in the emergency department on the 15th.  The pain and discomfort continues.  It is difficult to swallow.  No shortness of breath wheezing stridor or other symptoms.  No chest pain.  No blood in the stool.  He has had some headache and body aches and fatigue.  No history of mononucleosis.  He has had difficulty going to work and sleeping due to pain.  He previously had COVID and was given steroids for that and [place] sugars and at that point after that he had diabetes diagnosed.    Seen here on 4/15    Allergies:  Allergies   Allergen Reactions     No Known Allergies        Problem List:    Patient Active Problem List    Diagnosis Date Noted     Type II diabetes mellitus (H) 11/23/2021     Priority: Medium     Carpal tunnel syndrome, bilateral 10/04/2019     Priority: Medium     Cubital tunnel syndrome of both upper extremities 10/04/2019     Priority: Medium     Medial epicondylitis of right elbow 10/04/2019     Priority: Medium     Medial epicondylitis, left 10/04/2019     Priority: Medium     Osteoarthritis of left knee 10/12/2012     Priority: Medium     Formatting of this note might be different from the original.  S/p ACL repair in 1995, and arthroscopy in 1997 and 2007.       Displacement of lumbar intervertebral disc without myelopathy 04/03/2012     Priority: Medium     Lateral epicondylitis 10/20/2010     Priority: Medium     Formatting of this note might be different from the original.  Bilateral elbows          Past Medical History:    No past medical history on file.    Past Surgical History:    Past Surgical History:   Procedure  Laterality Date     ORTHOPEDIC SURGERY         Family History:    No family history on file.    Social History:  Marital Status:   [2]  Social History     Tobacco Use     Smoking status: Never     Smokeless tobacco: Never   Substance Use Topics     Alcohol use: Not Currently     Drug use: Never        Medications:    amoxicillin (AMOXIL) 875 MG tablet  Ascorbic Acid (VITAMIN C PO)  clindamycin (CLEOCIN) 300 MG capsule  ibuprofen (ADVIL/MOTRIN) 200 MG tablet  MOUNJARO 10 MG/0.5ML pen  oxyCODONE (ROXICODONE) 5 MG tablet          Review of Systems   All other systems reviewed and are negative.      Physical Exam   BP: (!) 134/96  Pulse: 79  Temp: 98.1  F (36.7  C)  Resp: 16  Weight: (!) 151.8 kg (334 lb 9.6 oz)  SpO2: 97 %      Physical Exam  Vitals and nursing note reviewed.   Constitutional:       General: He is not in acute distress.     Appearance: Normal appearance. He is well-developed. He is not diaphoretic.   HENT:      Head: Normocephalic and atraumatic.      Right Ear: External ear normal.      Left Ear: External ear normal.      Nose: Nose normal.      Mouth/Throat:      Pharynx: Posterior oropharyngeal erythema present. No oropharyngeal exudate.      Comments: Uvula midline  Eyes:      General: No scleral icterus.     Extraocular Movements: Extraocular movements intact.      Conjunctiva/sclera: Conjunctivae normal.      Pupils: Pupils are equal, round, and reactive to light.   Cardiovascular:      Rate and Rhythm: Normal rate.   Pulmonary:      Effort: Pulmonary effort is normal.      Breath sounds: Normal breath sounds.   Musculoskeletal:         General: Normal range of motion.      Cervical back: Normal range of motion and neck supple.   Skin:     General: Skin is warm and dry.      Findings: No rash.   Neurological:      General: No focal deficit present.      Mental Status: He is alert and oriented to person, place, and time.   Psychiatric:         Mood and Affect: Mood normal.         ED Course                  Procedures             Results for orders placed or performed during the hospital encounter of 05/02/23 (from the past 24 hour(s))   CBC with platelets differential    Narrative    The following orders were created for panel order CBC with platelets differential.  Procedure                               Abnormality         Status                     ---------                               -----------         ------                     CBC with platelets and d...[[id number]]  Normal              Final result               Manual Differential[[id number]]          Abnormal            Final result                 Please view results for these tests on the individual orders.   Comprehensive metabolic panel   Result Value Ref Range    Sodium 139 136 - 145 mmol/L    Potassium 4.4 3.4 - 5.3 mmol/L    Chloride 100 98 - 107 mmol/L    Carbon Dioxide (CO2) 30 (H) 22 - 29 mmol/L    Anion Gap 9 7 - 15 mmol/L    Urea Nitrogen 24.4 (H) 6.0 - 20.0 mg/dL    Creatinine 1.22 (H) 0.67 - 1.17 mg/dL    Calcium 8.9 8.6 - 10.0 mg/dL    Glucose 93 70 - 99 mg/dL    Alkaline Phosphatase 87 40 - 129 U/L    AST 13 10 - 50 U/L    ALT 23 10 - 50 U/L    Protein Total 7.0 6.4 - 8.3 g/dL    Albumin 4.0 3.5 - 5.2 g/dL    Bilirubin Total 0.3 <=1.2 mg/dL    GFR Estimate 74 >60 mL/min/1.73m2   Erythrocyte sedimentation rate auto   Result Value Ref Range    Erythrocyte Sedimentation Rate 9 0 - 15 mm/hr   Mononucleosis screen   Result Value Ref Range    Mononucleosis Screen Negative Negative   CBC with platelets and differential   Result Value Ref Range    WBC Count 10.8 4.0 - 11.0 10e3/uL    RBC Count 5.13 4.40 - 5.90 10e6/uL    Hemoglobin 15.1 13.3 - 17.7 g/dL    Hematocrit 44.0 40.0 - 53.0 %    MCV 86 78 - 100 fL    MCH 29.4 26.5 - 33.0 pg    MCHC 34.3 31.5 - 36.5 g/dL    RDW 11.9 10.0 - 15.0 %    Platelet Count 192 150 - 450 10e3/uL   Manual Differential   Result Value Ref Range    % Neutrophils 52 %    % Lymphocytes 43 %    % Monocytes 5  %    % Eosinophils 0 %    % Basophils 0 %    Absolute Neutrophils 5.6 1.6 - 8.3 10e3/uL    Absolute Lymphocytes 4.6 0.8 - 5.3 10e3/uL    Absolute Monocytes 0.5 0.0 - 1.3 10e3/uL    Absolute Eosinophils 0.0 0.0 - 0.7 10e3/uL    Absolute Basophils 0.0 0.0 - 0.2 10e3/uL    RBC Morphology Confirmed RBC Indices     Platelet Assessment  Automated Count Confirmed. Platelet morphology is normal.     Automated Count Confirmed. Platelet morphology is normal.    Reactive Lymphocytes Present (A) None Seen   Symptomatic Influenza A/B, RSV, & SARS-CoV2 PCR (COVID-19) Nose    Specimen: Nose; Swab   Result Value Ref Range    Influenza A PCR Negative Negative    Influenza B PCR Negative Negative    RSV PCR Negative Negative    SARS CoV2 PCR Negative Negative    Narrative    Testing was performed using the Xpert Xpress CoV2/Flu/RSV Assay on the [employer] GeneXpert Instrument. This test should be ordered for the detection of SARS-CoV-2, influenza, and RSV viruses in individuals who meet clinical and/or epidemiological criteria. Test performance is unknown in asymptomatic patients. This test is for in vitro diagnostic use under the FDA EUA for laboratories certified under CLIA to perform high or moderate complexity testing. This test has not been FDA cleared or approved. A negative result does not rule out the presence of PCR inhibitors in the specimen or target RNA in concentration below the limit of detection for the assay. If only one viral target is positive but coinfection with multiple targets is suspected, the sample should be re-tested with another FDA cleared, approved, or authorized test, if coinfection would change clinical management. This test was validated by the [facility]. These laboratories are certified under the Clinical Laboratory Improvement Amendments of 1988 (CLIA-88) as qualified to perform high complexity laboratory testing.   Soft tissue neck CT w contrast    Narrative    EXAM: CT SOFT TISSUE  NECK W CONTRAST  LOCATION: [facility]  DATE/TIME: 5/2/2023 5:19 PM CDT    INDICATION: Sore throat  COMPARISON: None.  CONTRAST: Isovue 370 80 mL  TECHNIQUE: Routine CT Soft Tissue Neck with IV contrast. Multiplanar reformats. Dose reduction techniques were used.    FINDINGS:     MUCOSAL SPACES/SOFT TISSUES: Mild striated hyperenhancement of the palatine tonsils. Normal vocal cords and infraglottic trachea. Normal parapharyngeal space and subcutaneous soft tissues. Unremarkable oral cavity,  spaces, and floor of mouth   structures, allowing for beam hardening from dental amalgam.    LYMPH NODES: Small, likely reactive cervical lymph nodes.     SALIVARY GLANDS: Normal parotid and submandibular glands.    THYROID: Normal.     VESSELS: Vascular structures of the neck are patent.    VISUALIZED INTRACRANIAL/ORBITS/SINUSES: No abnormality of the visualized intracranial compartment or orbits. Visualized mastoid air cells are clear. No significant paranasal sinus mucosal thickening.     OTHER: No destructive osseous lesion. The included lung apices are clear.      Impression    IMPRESSION:   1.  Acute palatine tonsillitis without abscess formation.       Medications   0.9% sodium chloride BOLUS (0 mLs Intravenous Stopped 5/2/23 1824)     Followed by   sodium chloride 0.9% infusion (has no administration in time range)   HYDROmorphone (PF) (DILAUDID) injection 0.5 mg (0.5 mg Intravenous $Given 5/2/23 1647)   ondansetron (ZOFRAN) injection 4 mg (has no administration in time range)   ketorolac (TORADOL) injection 15 mg (15 mg Intravenous $Given 5/2/23 1653)   dexamethasone PF (DECADRON) injection 6 mg (6 mg Intravenous $Given 5/2/23 1650)   iopamidol (ISOVUE-370) solution 500 mL (80 mLs Intravenous $Given 5/2/23 1709)   sodium chloride 0.9 % bag 100mL for CT scan flush use (70 mLs Intravenous $Given 5/2/23 1709)       Assessments & Plan (with Medical Decision Making)    47-year-old  male with a sore throat..  He has had 2 negative strep swabs and cultures.  Antibiotics started today.  Steroids given initially without improvement.  He had difficulty sleeping and no improvement in his symptoms.  He feels like it is getting worse.  There is no fever, hot potato voice, drooling, stridor, distress.  He is not tripoding.  IV established, IV fluids, Zofran, Dilaudid, Toradol, Decadron given.  CT scan of the soft tissue neck ordered along with labs including a CBC CMP sed rate COVID swab.  I did not repeat a strep swab today.  Labs are for the most part reassuring.  Sed rate is normal.  White blood cell count is normal.  There are a few reactive lymphocytes.  This could be viral tonsillitis versus bacterial.  2 swabs with cultures were negative for bacterial etiology.  The patient does not have a fever.  He is already been started on amoxicillin.  Because of his diabetes and previous adverse association with steroids we only give Decadron here but will not give that for home.  He had some improvement with the oxycodone and Toradol here.  I recommended avoiding NSAIDs chronically secondary to his renal insufficiency and diabetes.  I think mostly his renal function is related to dehydration from sore throat not drinking enough water.  I recommended pushing p.o. fluids, oxycodone, switching antibiotic to clindamycin for broader coverage, ENT follow-up if no improvement.  Return to ER precautions and fall precautions discussed.  Referral was placed to ENT.  All questions answered prior to discharge.     I have reviewed the nursing notes.    I have reviewed the findings, diagnosis, plan and need for follow up with the patient.          Medical Decision Making  The patient's presentation was of moderate complexity (an acute illness with systemic symptoms).    The patient's evaluation involved:  review of external note(s) from 2 sources (see separate area of note for details)  ordering and/or review of 3+  test(s) in this encounter (see separate area of note for details)  review of 3+ test result(s) ordered prior to this encounter (see separate area of note for details)    The patient's management necessitated moderate risk (prescription drug management including medications given in the ED).        New Prescriptions    CLINDAMYCIN (CLEOCIN) 300 MG CAPSULE    Take 1 capsule (300 mg) by mouth 4 times daily for 10 days    OXYCODONE (ROXICODONE) 5 MG TABLET    Take 1 tablet (5 mg) by mouth every 6 hours as needed for pain       Final diagnoses:   Acute tonsillitis, unspecified etiology       5/2/2023   [facility] EMERGENCY DEPT     Umer Denis MD  05/02/23 [id number]

## 2023-05-02 NOTE — DISCHARGE INSTRUCTIONS
Stop the amoxicillin  Take the oxycodone and clindamycin as directed  Do not drive if taking the oxycodone  Make an appointment with ENT in case you do not get better.   Return to the ER if new or worsening symptoms.  It was a pleasure to meet you and see you today.  I hope you get better quickly.  Follow-up within a week to repeat your kidney function test.  They were a little off today.  Avoid NSAIDs.  Take Tylenol for headache and body aches or the oxycodone.  Drink plenty of water.

## 2023-05-02 NOTE — ED TRIAGE NOTES
Pt presents with throat pain and difficulty swallowing for one month. Pt  Was here 3 weeks ago strept negative. Pt did got to [facility] Friday for similar and strept negative. Pt was started on amoxicillin yesterday with no relief. Pt 's MD thinks could be abcess.      Triage Assessment     Row Name 05/02/23 1502       Triage Assessment (Adult)    Airway WDL WDL       Respiratory WDL    Respiratory WDL WDL       Skin Circulation/Temperature WDL    Skin Circulation/Temperature WDL WDL       Cardiac WDL    Cardiac WDL WDL       Peripheral/Neurovascular WDL    Peripheral Neurovascular WDL WDL       Cognitive/Neuro/Behavioral WDL    Cognitive/Neuro/Behavioral WDL WDL

## 2023-05-04 NOTE — TELEPHONE ENCOUNTER
Pls see [facility] message from 4/29/23 (patient responded on 5/4/23).    Order pended and message routed to provider for consideration.     Julie Behrendt RN

## 2023-05-12 ENCOUNTER — LAB (OUTPATIENT)
Dept: LAB | Facility: CLINIC | Age: 48
End: 2023-05-12
Payer: COMMERCIAL

## 2023-05-12 DIAGNOSIS — N17.9 AKI (ACUTE KIDNEY INJURY) (H): ICD-10-CM

## 2023-05-12 LAB
ANION GAP SERPL CALCULATED.3IONS-SCNC: 7 MMOL/L (ref 7–15)
BUN SERPL-MCNC: 21.9 MG/DL (ref 6–20)
CALCIUM SERPL-MCNC: 9 MG/DL (ref 8.6–10)
CHLORIDE SERPL-SCNC: 106 MMOL/L (ref 98–107)
CREAT SERPL-MCNC: 1.18 MG/DL (ref 0.67–1.17)
DEPRECATED HCO3 PLAS-SCNC: 29 MMOL/L (ref 22–29)
GFR SERPL CREATININE-BSD FRML MDRD: 77 ML/MIN/1.73M2
GLUCOSE SERPL-MCNC: 119 MG/DL (ref 70–99)
POTASSIUM SERPL-SCNC: 4.4 MMOL/L (ref 3.4–5.3)
SODIUM SERPL-SCNC: 142 MMOL/L (ref 136–145)

## 2023-05-12 PROCEDURE — 36415 COLL VENOUS BLD VENIPUNCTURE: CPT

## 2023-05-12 PROCEDURE — 80048 BASIC METABOLIC PNL TOTAL CA: CPT

## 2023-05-15 ENCOUNTER — MYC MEDICAL ADVICE (OUTPATIENT)
Dept: FAMILY MEDICINE | Facility: CLINIC | Age: 48
End: 2023-05-15
Payer: COMMERCIAL

## 2023-05-15 DIAGNOSIS — N17.9 AKI (ACUTE KIDNEY INJURY) (H): Primary | ICD-10-CM

## 2023-06-02 ENCOUNTER — MYC MEDICAL ADVICE (OUTPATIENT)
Dept: FAMILY MEDICINE | Facility: CLINIC | Age: 48
End: 2023-06-02
Payer: COMMERCIAL

## 2023-06-02 ENCOUNTER — HEALTH MAINTENANCE LETTER (OUTPATIENT)
Age: 48
End: 2023-06-02

## 2023-06-02 DIAGNOSIS — Z13.220 SCREENING FOR LIPID DISORDERS: ICD-10-CM

## 2023-06-02 DIAGNOSIS — E11.9 TYPE 2 DIABETES MELLITUS WITHOUT COMPLICATION, WITHOUT LONG-TERM CURRENT USE OF INSULIN (H): Primary | ICD-10-CM

## 2023-06-02 NOTE — TELEPHONE ENCOUNTER
Pls see [facility] message from 6/2/23.     FLP order pended and message routed to provider for consideration.     Julie Behrendt RN

## 2023-06-09 ENCOUNTER — OFFICE VISIT (OUTPATIENT)
Dept: FAMILY MEDICINE | Facility: CLINIC | Age: 48
End: 2023-06-09
Payer: COMMERCIAL

## 2023-06-09 ENCOUNTER — ANCILLARY PROCEDURE (OUTPATIENT)
Dept: GENERAL RADIOLOGY | Facility: CLINIC | Age: 48
End: 2023-06-09
Attending: STUDENT IN AN ORGANIZED HEALTH CARE EDUCATION/TRAINING PROGRAM
Payer: COMMERCIAL

## 2023-06-09 VITALS
DIASTOLIC BLOOD PRESSURE: 80 MMHG | SYSTOLIC BLOOD PRESSURE: 136 MMHG | HEART RATE: 84 BPM | RESPIRATION RATE: 18 BRPM | TEMPERATURE: 97.9 F | BODY MASS INDEX: 45.1 KG/M2 | WEIGHT: 315 LBS | HEIGHT: 70 IN

## 2023-06-09 DIAGNOSIS — N17.9 AKI (ACUTE KIDNEY INJURY) (H): ICD-10-CM

## 2023-06-09 DIAGNOSIS — E11.9 TYPE 2 DIABETES MELLITUS WITHOUT COMPLICATION, WITHOUT LONG-TERM CURRENT USE OF INSULIN (H): ICD-10-CM

## 2023-06-09 DIAGNOSIS — Z13.220 SCREENING FOR LIPID DISORDERS: ICD-10-CM

## 2023-06-09 DIAGNOSIS — M25.511 CHRONIC RIGHT SHOULDER PAIN: ICD-10-CM

## 2023-06-09 DIAGNOSIS — G89.29 CHRONIC RIGHT SHOULDER PAIN: Primary | ICD-10-CM

## 2023-06-09 DIAGNOSIS — G89.29 CHRONIC RIGHT SHOULDER PAIN: ICD-10-CM

## 2023-06-09 DIAGNOSIS — M25.511 CHRONIC RIGHT SHOULDER PAIN: Primary | ICD-10-CM

## 2023-06-09 DIAGNOSIS — R79.82 ELEVATED C-REACTIVE PROTEIN (CRP): ICD-10-CM

## 2023-06-09 LAB
ANION GAP SERPL CALCULATED.3IONS-SCNC: 12 MMOL/L (ref 7–15)
BUN SERPL-MCNC: 20.1 MG/DL (ref 6–20)
CALCIUM SERPL-MCNC: 8.8 MG/DL (ref 8.6–10)
CHLORIDE SERPL-SCNC: 102 MMOL/L (ref 98–107)
CHOLEST SERPL-MCNC: 153 MG/DL
CREAT SERPL-MCNC: 1.11 MG/DL (ref 0.67–1.17)
CREAT UR-MCNC: 155 MG/DL
CRP SERPL-MCNC: 5.77 MG/L
DEPRECATED HCO3 PLAS-SCNC: 25 MMOL/L (ref 22–29)
GFR SERPL CREATININE-BSD FRML MDRD: 82 ML/MIN/1.73M2
GLUCOSE SERPL-MCNC: 136 MG/DL (ref 70–99)
HBA1C MFR BLD: 5.8 % (ref 0–5.6)
HDLC SERPL-MCNC: 44 MG/DL
LDLC SERPL CALC-MCNC: 88 MG/DL
MICROALBUMIN UR-MCNC: <12 MG/L
MICROALBUMIN/CREAT UR: NORMAL MG/G{CREAT}
NONHDLC SERPL-MCNC: 109 MG/DL
POTASSIUM SERPL-SCNC: 4.1 MMOL/L (ref 3.4–5.3)
SODIUM SERPL-SCNC: 139 MMOL/L (ref 136–145)
TRIGL SERPL-MCNC: 105 MG/DL

## 2023-06-09 PROCEDURE — 86140 C-REACTIVE PROTEIN: CPT | Performed by: STUDENT IN AN ORGANIZED HEALTH CARE EDUCATION/TRAINING PROGRAM

## 2023-06-09 PROCEDURE — 80048 BASIC METABOLIC PNL TOTAL CA: CPT | Performed by: STUDENT IN AN ORGANIZED HEALTH CARE EDUCATION/TRAINING PROGRAM

## 2023-06-09 PROCEDURE — 80061 LIPID PANEL: CPT | Performed by: STUDENT IN AN ORGANIZED HEALTH CARE EDUCATION/TRAINING PROGRAM

## 2023-06-09 PROCEDURE — 73030 X-RAY EXAM OF SHOULDER: CPT | Mod: TC | Performed by: RADIOLOGY

## 2023-06-09 PROCEDURE — 83036 HEMOGLOBIN GLYCOSYLATED A1C: CPT | Performed by: STUDENT IN AN ORGANIZED HEALTH CARE EDUCATION/TRAINING PROGRAM

## 2023-06-09 PROCEDURE — 82043 UR ALBUMIN QUANTITATIVE: CPT | Performed by: STUDENT IN AN ORGANIZED HEALTH CARE EDUCATION/TRAINING PROGRAM

## 2023-06-09 PROCEDURE — 82570 ASSAY OF URINE CREATININE: CPT | Performed by: STUDENT IN AN ORGANIZED HEALTH CARE EDUCATION/TRAINING PROGRAM

## 2023-06-09 PROCEDURE — 36415 COLL VENOUS BLD VENIPUNCTURE: CPT | Performed by: STUDENT IN AN ORGANIZED HEALTH CARE EDUCATION/TRAINING PROGRAM

## 2023-06-09 PROCEDURE — 99214 OFFICE O/P EST MOD 30 MIN: CPT | Performed by: STUDENT IN AN ORGANIZED HEALTH CARE EDUCATION/TRAINING PROGRAM

## 2023-06-09 RX ORDER — ACETAMINOPHEN 500 MG
500-1000 TABLET ORAL EVERY 6 HOURS PRN
COMMUNITY

## 2023-06-09 ASSESSMENT — PAIN SCALES - GENERAL: PAINLEVEL: EXTREME PAIN (8)

## 2023-06-09 NOTE — PROGRESS NOTES
Assessment & Plan     Lyle 47 year old who presents for follow up on renal function testing. Patient was seen multiple times in April and May of this year for ongoing sore throat. Ultimately treated for tonsilitis with clindamycin with improvement. Occasionally with sore throat still.     In the workup in the pas few months he has had mildly elevated CRP with normal ESR, as well as elevated creatinine. He has since stopped taking ibuprofen and stopped Mounjaro, which was started earlier this year. He doesn't remember where he was seen for that and no recent records of A1c check can be seen on chart review. Labs had previously been ordered for follow up and he has not had these drawn yet, so these are drawn today including lipid, BMP, a1c, and a urine alb:creat.     Chronic right shoulder pain  In addition, we discussed chronic right shoulder pain that has worsened in the past 6 months or so after a fall this winter. It is now causing some pain with sleeping on that side. Has previously had rotator cuff injury, he thinks in his 20's, and has had shoulder injections in the past. He does have decreased ROM of the right shoulder compared to the left. Some tenderness to AC joint. We discussed repeat imaging today as on XR in 2021 through [facility] he had a cyst noted, and prior to repeat joint injection would ideally like to see improvement in that. Rechecked XR today. Will either have him RTC here for injection vs referral to sports med pending results.   - XR Shoulder Right G/E 3 Views    Elevated C-reactive protein (CRP)  Recheck CRP today. If remains elevated, consider further rheum workup given facial rash he has previously experienced.   - CRP inflammation    Type 2 diabetes mellitus without complication, without long-term current use of insulin (H)  As above, due for lab check. If A1c is elevated, will need to discuss restarting a medication for treatment. Would preferentially avoid Mounjaro given recent  "experience with it.   - Albumin Random Urine Quantitative with Creat Ratio  - Hemoglobin A1c    Screening for lipid disorders  Due for check  - Lipid panel reflex to direct LDL Fasting    JUANCARLOS (acute kidney injury) (H)  Due for recheck BMP given recent elevation in creatinine.   - Basic metabolic panel    I spent a total of 30 minutes on the day of the visit.   Time spent by me doing chart review, history and exam, documentation and further activities per the note     BMI:   Estimated body mass index is 47.92 kg/m  as calculated from the following:    Height as of this encounter: 1.778 m (5' 10\").    Weight as of this encounter: 151.5 kg (334 lb).     Felicia Ochoa MD  [facility]    Meghan Lima is a 47 year old, presenting for the following health issues:  Chief Complaint   Patient presents with     RECHECK     Lab work, kidney function           6/9/2023    10:11 AM   Additional Questions   Roomed by Aria   Accompanied by Self     History of Present Illness       Reason for visit:  Checkup    He eats 2-3 servings of fruits and vegetables daily.He consumes 1 sweetened beverage(s) daily.He exercises with enough effort to increase his heart rate 9 or less minutes per day.  He exercises with enough effort to increase his heart rate 3 or less days per week.   He is taking medications regularly.     Stopped the ibuprofen. Does take a few tylenol.   Was still getting headaches with sore throat, still getting once in a while.     Stopped the mounjaro to see if that was part of it. Feels better off the Mounjaro.   Mouth not as dry, with alcohol not having issues anymore. Doesn't drink often to begin with.     Still heartburn once in a while.     Was on mounjaro maybe 3 months. Doesn't remember where he was seen for that.     Urinary symptoms - none. No blood, pain.     History of varicose veins. So does get some leg swelling.     Review of Systems   Constitutional, HEENT, " "cardiovascular, pulmonary, GI, , musculoskeletal, neuro, skin, endocrine and psych systems are negative, except as otherwise noted.      Objective    /80 (BP Location: Left arm, Patient Position: Sitting, Cuff Size: Adult Regular)   Pulse 84   Temp 97.9  F (36.6  C) (Tympanic)   Resp 18   Ht 1.778 m (5' 10\")   Wt (!) 151.5 kg (334 lb)   BMI 47.92 kg/m    Body mass index is 47.92 kg/m .  Physical Exam  Constitutional:       Appearance: Normal appearance.   HENT:      Head: Normocephalic.   Eyes:      General: No scleral icterus.     Extraocular Movements: Extraocular movements intact.      Conjunctiva/sclera: Conjunctivae normal.   Cardiovascular:      Rate and Rhythm: Normal rate.   Pulmonary:      Effort: Pulmonary effort is normal.   Musculoskeletal:      Right shoulder: Bony tenderness (over AC joint) present. Decreased range of motion (mildly with abduction, flexion, and internal rotation compared to left.).      Left shoulder: Normal.   Neurological:      General: No focal deficit present.      Mental Status: He is alert and oriented to person, place, and time.         Results from this visitNo results found for any visits on 06/09/23.                "

## 2023-06-16 ENCOUNTER — OFFICE VISIT (OUTPATIENT)
Dept: FAMILY MEDICINE | Facility: CLINIC | Age: 48
End: 2023-06-16
Payer: COMMERCIAL

## 2023-06-16 VITALS
DIASTOLIC BLOOD PRESSURE: 80 MMHG | SYSTOLIC BLOOD PRESSURE: 132 MMHG | OXYGEN SATURATION: 96 % | BODY MASS INDEX: 45.1 KG/M2 | HEIGHT: 70 IN | RESPIRATION RATE: 16 BRPM | WEIGHT: 315 LBS | HEART RATE: 72 BPM

## 2023-06-16 DIAGNOSIS — M19.011 PRIMARY LOCALIZED OSTEOARTHROSIS OF RIGHT SHOULDER REGION: Primary | ICD-10-CM

## 2023-06-16 PROCEDURE — 20610 DRAIN/INJ JOINT/BURSA W/O US: CPT | Mod: RT | Performed by: STUDENT IN AN ORGANIZED HEALTH CARE EDUCATION/TRAINING PROGRAM

## 2023-06-16 RX ORDER — TRIAMCINOLONE ACETONIDE 40 MG/ML
40 INJECTION, SUSPENSION INTRA-ARTICULAR; INTRAMUSCULAR ONCE
Status: COMPLETED | OUTPATIENT
Start: 2023-06-16 | End: 2023-06-16

## 2023-06-16 RX ORDER — LIDOCAINE HYDROCHLORIDE 10 MG/ML
4 INJECTION, SOLUTION INFILTRATION; PERINEURAL ONCE
Status: COMPLETED | OUTPATIENT
Start: 2023-06-16 | End: 2023-06-16

## 2023-06-16 RX ADMIN — TRIAMCINOLONE ACETONIDE 40 MG: 40 INJECTION, SUSPENSION INTRA-ARTICULAR; INTRAMUSCULAR at 13:59

## 2023-06-16 RX ADMIN — LIDOCAINE HYDROCHLORIDE 4 ML: 10 INJECTION, SOLUTION INFILTRATION; PERINEURAL at 13:58

## 2023-06-16 ASSESSMENT — PAIN SCALES - GENERAL: PAINLEVEL: EXTREME PAIN (8)

## 2023-06-16 NOTE — PATIENT INSTRUCTIONS
JOINT INJECTION  What is a joint injection?  A joint injection is a way of putting an anti-inflammatory medicine directly (with a needle) into an inflamed joint.  The medicine is an anti-inflammatory steroid similar to cortisone.  Why do it?  The medicine causes a rapid decrease in the inflammation, and that makes the joint less swollen or painful.  We expect the injection to suppress the inflammation for six to twelve months.  Sometimes it works for a longer period of time, and sometimes for a shorter period of time.    A good response to an injection may let us to avoid starting other medicines or decrease other medicine(s) the child is already taking.   If there is a poor or incomplete response to the first injection, a second injection may be recommended and often works much better.    How is it done?  It can be done with your child awake or asleep.  Children are often scared before their first joint injection.  If this is a concern, a Child Life Specialist can help prepare the child for the procedure, and help with distraction and coaching during the procedure.  For children who cannot do the procedure awake, we can do the injection with sedation at the hospital.  When done awake, we can prepare the skin by applying a numbing cream (EMLA) to the skin about one hour before we start.  The skin over the joint is cleaned carefully with an antiseptic.  A cold spray called ethyl chloride can be sprayed on the skin to numb it more, and then a very small needle is used to inject numbing medicine under the skin.  This numbing medicine is called lidocaine and is similar to what a dentist uses.  We do not numb small joints since we use the same size needle to inject the steroid as we do to inject the lidocaine.  What about side effects?  Side effects are uncommon, but you need to know that:  The joint might become infected.  This would happen in the first two days after the injection.  The joint would become very painful,  red, quite warm and a fever may be present.  This is very rare.  It occurs about once out of 50,000 injections.  The skin near the injection site might become thin (atrophic) or turn white.  This is an effect of steroid leaking back into the skin, and may slowly return to normal over a few years or may be permanent.  Sometimes the medicine will irritate the joint at first and the hurt for several hours before it starts to feel better.  Putting ice on the area, or taking a NSAID (ibuprofen or similar) can help.    What about after the injection?  For the first 24 hours after the injection, avoid soaking the joint.  That means no swimming, hot tubs or baths.  Brief showers are okay.    It is okay to do normal activities but avoid vigorous things like running, biking, football, etc.    You should call if the joint appears red, warm or becomes more painful.    Our nurse or physician can be reached at [phone], or you can call toll-free on weekdays at [facility] ([phone]), or you can reach the on-call physician anytime through the paging  at [phone].

## 2023-06-16 NOTE — PROGRESS NOTES
Injection Note    Clarence Stahl is a patient of Felicia Vazquez here for injection for kenolog injection of the right shoulder.    Consent: Affirmation of informed consent was signed and scanned into the medical record. Risks, benefits and alternatives were discussed. Patient's questions were elicited and answered.   Procedure safety checklist was completed:  Yes  Time Out (Pause for the Cause) completed: Yes    Preoperative Diagnosis:  Primary osteoarthritis right shoulder  Postoperative Diagnosis: same       The right shoulder was prepped  in the usual sterile fashion INJECTION:  Using 4 mL of 1% lidocaine mixed with 40 mg of kenalog, the   was successfully injected without complication.  Patient did experience some pain relief following injection.    Technique:   Skin prep Alcohol  Anesthesia Ethyl choride  Complications:  No  Tolerance:  Pt tolerated procedure well and was in stable condition.     Follow up: Patient was instructed to use ice on the affected area tonight for at least 30 minutes and  that there will be a return to pain in a couple hours followed by relief in the next several days to a week. Patient was advised to call if increasing redness and swelling.     Follow up only if unimproved.    Faculty: Felicia Ochoa MD present for and supervised this entire procedure.            6/16/2023     1:20 PM   Additional Questions   Roomed by Aria   Accompanied by Self     History of Present Illness       Reason for visit:  Shoulder injection  Symptom onset:  More than a month  Symptoms include:  Right shoulder pain  Symptom progression:  Worsening    He eats 2-3 servings of fruits and vegetables daily.He consumes 1 sweetened beverage(s) daily.He exercises with enough effort to increase his heart rate 9 or less minutes per day.  He exercises with enough effort to increase his heart rate 3 or less days per week.   He is taking medications regularly.

## 2023-09-17 ENCOUNTER — HEALTH MAINTENANCE LETTER (OUTPATIENT)
Age: 48
End: 2023-09-17

## 2023-12-04 ENCOUNTER — MYC MEDICAL ADVICE (OUTPATIENT)
Dept: FAMILY MEDICINE | Facility: CLINIC | Age: 48
End: 2023-12-04
Payer: COMMERCIAL

## 2023-12-04 DIAGNOSIS — M19.011 PRIMARY LOCALIZED OSTEOARTHROSIS OF RIGHT SHOULDER REGION: Primary | ICD-10-CM

## 2023-12-11 ENCOUNTER — OFFICE VISIT (OUTPATIENT)
Dept: URGENT CARE | Facility: URGENT CARE | Age: 48
End: 2023-12-11
Payer: COMMERCIAL

## 2023-12-11 ENCOUNTER — ANCILLARY PROCEDURE (OUTPATIENT)
Dept: GENERAL RADIOLOGY | Facility: CLINIC | Age: 48
End: 2023-12-11
Attending: NURSE PRACTITIONER
Payer: COMMERCIAL

## 2023-12-11 VITALS
HEART RATE: 70 BPM | SYSTOLIC BLOOD PRESSURE: 143 MMHG | WEIGHT: 315 LBS | RESPIRATION RATE: 16 BRPM | TEMPERATURE: 97.3 F | DIASTOLIC BLOOD PRESSURE: 86 MMHG | OXYGEN SATURATION: 97 % | BODY MASS INDEX: 50.51 KG/M2

## 2023-12-11 DIAGNOSIS — S49.92XA SHOULDER INJURY, LEFT, INITIAL ENCOUNTER: ICD-10-CM

## 2023-12-11 DIAGNOSIS — M25.512 ACUTE PAIN OF LEFT SHOULDER: Primary | ICD-10-CM

## 2023-12-11 DIAGNOSIS — M25.512 ACUTE PAIN OF LEFT SHOULDER: ICD-10-CM

## 2023-12-11 DIAGNOSIS — R73.03 PRE-DIABETES: ICD-10-CM

## 2023-12-11 LAB
ANION GAP SERPL CALCULATED.3IONS-SCNC: 11 MMOL/L (ref 7–15)
BUN SERPL-MCNC: 16.5 MG/DL (ref 6–20)
CALCIUM SERPL-MCNC: 9.2 MG/DL (ref 8.6–10)
CHLORIDE SERPL-SCNC: 102 MMOL/L (ref 98–107)
CREAT SERPL-MCNC: 1.07 MG/DL (ref 0.67–1.17)
DEPRECATED HCO3 PLAS-SCNC: 27 MMOL/L (ref 22–29)
EGFRCR SERPLBLD CKD-EPI 2021: 86 ML/MIN/1.73M2
GLUCOSE SERPL-MCNC: 117 MG/DL (ref 70–99)
HBA1C MFR BLD: 6.6 % (ref 0–5.6)
POTASSIUM SERPL-SCNC: 4.6 MMOL/L (ref 3.4–5.3)
SODIUM SERPL-SCNC: 140 MMOL/L (ref 135–145)

## 2023-12-11 PROCEDURE — 99214 OFFICE O/P EST MOD 30 MIN: CPT | Performed by: NURSE PRACTITIONER

## 2023-12-11 PROCEDURE — 80048 BASIC METABOLIC PNL TOTAL CA: CPT | Performed by: NURSE PRACTITIONER

## 2023-12-11 PROCEDURE — 83036 HEMOGLOBIN GLYCOSYLATED A1C: CPT | Performed by: NURSE PRACTITIONER

## 2023-12-11 PROCEDURE — 73030 X-RAY EXAM OF SHOULDER: CPT | Mod: TC | Performed by: RADIOLOGY

## 2023-12-11 PROCEDURE — 36415 COLL VENOUS BLD VENIPUNCTURE: CPT | Performed by: NURSE PRACTITIONER

## 2023-12-11 RX ORDER — NAPROXEN 500 MG/1
500 TABLET ORAL 2 TIMES DAILY WITH MEALS
Qty: 40 TABLET | Refills: 0 | Status: SHIPPED | OUTPATIENT
Start: 2023-12-11 | End: 2023-12-31

## 2023-12-11 RX ORDER — PREDNISONE 20 MG/1
40 TABLET ORAL DAILY
Qty: 10 TABLET | Refills: 0 | Status: SHIPPED | OUTPATIENT
Start: 2023-12-11 | End: 2023-12-16

## 2023-12-11 NOTE — PROGRESS NOTES
SUBJECTIVE:   Clarence Stahl is a 48 year old male who presents to the clinic today complaining of shoulder pain on the left side.    Onset of injury or pain was 1 day(s) ago and was sudden onset. The pain is burning and sharp and anterior.  Injury history: pt was digging in freezer and heard, and felt a pop while pulling something out.    Exacerbated by: movement of shoulder and sleeping on that shoulder   Relieved by: nothing  Associated symptoms include Radiation of the pain  Weakness.    The patient has tried Ibuprofen and icy hot to improve symptoms.  He does not have a history of shoulder pain/injury.    No past medical history on file.    Current Outpatient Medications:     acetaminophen (TYLENOL) 500 MG tablet, Take 500-1,000 mg by mouth every 6 hours as needed for mild pain (Patient not taking: Reported on 12/11/2023), Disp: , Rfl:     Ascorbic Acid (VITAMIN C PO), Take 1 tablet by mouth daily (Patient not taking: Reported on 12/11/2023), Disp: , Rfl:     MOUNJARO 10 MG/0.5ML pen, INJECT 10 MG SUBCUTANEOUSLY ONCE WEEKLY FOR 4 WEEKS (Patient not taking: Reported on 6/9/2023), Disp: , Rfl:   Social History     Tobacco Use    Smoking status: Never    Smokeless tobacco: Never   Substance Use Topics    Alcohol use: Not Currently       EXAM:  BP (!) 143/86   Pulse 70   Temp 97.3  F (36.3  C) (Tympanic)   Resp 16   Wt (!) 159.7 kg (352 lb)   SpO2 97%   BMI 50.51 kg/m    General: no acute distress  Shoulder Exam: range of motion: abduction very limited, internal rotation very limited, external rotation very limited, extension very limited, flexion very limited, adduction very limited, Pain with internal rotation  Pain with external rotation  Pain with flexion  Pain with extension  Pain with abduction  Pain with adduction, positive impingement signs  Ext: pulses intact.  Neuro: sensation intact to light touch.      Xray: radiology read; The left glenohumeral and acromion clavicular joints are  negative for  fracture or dislocation.  ASSESSMENT:   1. Acute pain of left shoulder    - XR Shoulder Left G/E 3 Views; Future  - Neck/Shoulder/Back Order Shoulder Immobilizer; Left  - US Tenotomy Shoulder Multiple Tendons Left; Future    2. Shoulder injury, left, initial encounter      PLAN:  Prednisone daily for inflammation.   Use sling or shoulder immobilizer for support of shoulder and limited movement.  Follow up with your ortho provider as soon as able for recheck of shoulder, and next steps.   We will call you with ultra sound results.

## 2023-12-11 NOTE — Clinical Note
Hi Dr Brantley, I saw a patient of yours with a shoulder injury. I am worried about a tear. I believe he is seeing you for a right shoulder issue, there is now an injury to the left shoulder. He wanted me to notify you as he has an appointment in a week.

## 2023-12-11 NOTE — PATIENT INSTRUCTIONS
Prednisone daily for inflammation.   Use sling or shoulder immobilizer for support of shoulder and limited movement.  Follow up with your ortho provider as soon as able for recheck of shoulder, and next steps.   We will call you with ultra sound results.

## 2023-12-12 NOTE — RESULT ENCOUNTER NOTE
Elevated result  Clarence notified and encouraged to followup/discuss with PCP  Routed to PCP, Dr Ochoa

## 2023-12-18 NOTE — PROGRESS NOTES
ASSESSMENT & PLAN    Clarence was seen today for pain and pain.    Diagnoses and all orders for this visit:    Injury of left shoulder, initial encounter  -     MR Shoulder Left w/o Contrast; Future    Primary localized osteoarthrosis of right shoulder region  -     Orthopedic  Referral  -     Orthopedic  Referral; Future      This issue is acute on chronic and Worsening.        ICD-10-CM    1. Injury of left shoulder, initial encounter  S49.92XA MR Shoulder Left w/o Contrast      2. Primary localized osteoarthrosis of right shoulder region  M19.011 Orthopedic  Referral     Orthopedic  Referral          Discussed nature of shoulder osteoarthritis. Discussed symptom treatment with over-the-counter medications, ice or heat and rest if needed. Discussed physical therapy for strength. Discussed injection therapy including subacromial or glenohumeral corticosteroid injections. Also briefly discussed future consideration of referral to orthopedic surgery for further evaluation and discussion of arthroplasty.     Left Shoulder concern for rotator cuff tear, will obtain MRI.    Plan:  - Today's Plan of Care:  MRI of the Left Shoulder - Call [phone] to schedule MRI (can send to [facility] as well)    Referral for US guided right shoulder injection    Discussed activity considerations and other supportive care including Ice/Heat, OTC and other topical medications as needed.    -We also discussed other future treatment options:  Referral to Orthopedic Surgery  Referral to Physical Therapy  Consideration of injections    Follow Up: In clinic with Dr. Brantley after MRI (wait at least 1-2 days)    Concerning signs and symptoms were reviewed and all questions were answered at this time.    Thanks for the opportunity to participate in the care of this patient, I will keep you updated on their progress.    CC: Felicia Brantley MD CAQ  Sports Medicine  Physician  [facility]    -----  Chief Complaint   Patient presents with    Right Shoulder - Pain    Left Shoulder - Pain       SUBJECTIVE  Clarence Stahl is a/an 48 year old male who is seen in consultation at the request of Felicia Ochoa M.D. for evaluation of right and left shoulder pain.     The patient is seen by themselves.  The patient is Right handed    1) Right Shoulder  Onset: many years(s) ago, more in the last year, reports insidious onset without acute precipitating event.  Location of Pain: right shoulder; AC joint, bicipital groove  Worsened by: all movements, overhead movements  Better with: nothing   Treatments tried: ice, ibuprofen, other medications: topicals, previous imaging (xray 6/9/23), and corticosteroid injection (most recent date: has had 2 injections, June 16, 2023) that provided minimal of relief, had prior US guided injections with good relief.    Associated symptoms: numbness, tingling, weakness of right shoulder, and feeling of instabilitycatching, locking     2. Left shoulder  Onset; 2 weeks ago, he was pulling venison in his chest freezer, and pulled up, felt a pop  Location; anterior, bicipital groove   Better; nothing  Treatments tried; icing, Naprosyn, previous imaging (xray 12/11/23)  Associated symptoms; swelling, numbness, tingling, weakness, instability, popping     Orthopedic/Surgical history: YES - Date: 25 years ago had torn RTC on the right, did not have surgery      REVIEW OF SYSTEMS:  Review of Systems    OBJECTIVE:  There were no vitals taken for this visit.   General: healthy, alert and in no distress  Skin: no suspicious lesions or rash.  CV: distal perfusion intact   Resp: normal respiratory effort without conversational dyspnea   Psych: normal mood and affect  Gait: NORMAL  Neuro: Normal light sensory exam of upper extremity    Bilateral Shoulder exam    Inspection and Posture:       normal    Skin:        no visible deformities    Tender:         subacromial space bilateral       Biceps tendon bilaterally    Non Tender:       remainder of shoulder bilateral    ROM:        forward flexion 160 right, 90 left        abduction 170 right, 90 left        internal rotation gluteal bilateral       external rotation 35 bilateral       asymmetric scapular motion    Painful motions:       flexion left       elevation left    Strength:        abduction 5/5 right, 4/5 left       flexion 5/5 right, 4/5 left       internal rotation 5/5 right, 4/5 left       external rotation 5/5 right, 4/5 left    Impingement testing:       positive (+) Neer left       positive (+) Thompson left    Sensation:        normal sensation over shoulder and upper extremity       RADIOLOGY:  Final results and radiologist's interpretation, available in the [facility] health record.  Images were reviewed with the patient in the office today.  My personal interpretation of the performed imaging:     3 XR views of left shoulder reviewed 12/11/2023: no acute bony abnormality, no significant degenerative change    3 XR views of right shoulder reviewed 6/9/2023: no acute bony abnormality, moderate glenohumeral degenerative change    Reviewed UC and PCP notes along with prior orthopedic notes from 2021  Review of the result(s) of each unique test - XR

## 2023-12-20 ENCOUNTER — OFFICE VISIT (OUTPATIENT)
Dept: ORTHOPEDICS | Facility: CLINIC | Age: 48
End: 2023-12-20
Attending: STUDENT IN AN ORGANIZED HEALTH CARE EDUCATION/TRAINING PROGRAM
Payer: COMMERCIAL

## 2023-12-20 DIAGNOSIS — S49.92XA INJURY OF LEFT SHOULDER, INITIAL ENCOUNTER: Primary | ICD-10-CM

## 2023-12-20 DIAGNOSIS — M19.011 PRIMARY LOCALIZED OSTEOARTHROSIS OF RIGHT SHOULDER REGION: ICD-10-CM

## 2023-12-20 PROCEDURE — 99204 OFFICE O/P NEW MOD 45 MIN: CPT | Performed by: PEDIATRICS

## 2023-12-20 NOTE — PATIENT INSTRUCTIONS
Discussed nature of shoulder osteoarthritis. Discussed symptom treatment with over-the-counter medications, ice or heat and rest if needed. Discussed physical therapy for strength. Discussed injection therapy including subacromial or glenohumeral corticosteroid injections. Also briefly discussed future consideration of referral to orthopedic surgery for further evaluation and discussion of arthroplasty.     Left Shoulder concern for rotator cuff tear, will obtain MRI.    Plan:  - Today's Plan of Care:  MRI of the Left Shoulder - Call [phone] to schedule MRI (can send to [facility] as well)    Referral for US guided right shoulder injection    Discussed activity considerations and other supportive care including Ice/Heat, OTC and other topical medications as needed.    -We also discussed other future treatment options:  Referral to Orthopedic Surgery  Referral to Physical Therapy  Consideration of injections    Follow Up: In clinic with Dr. Brantley after MRI (wait at least 1-2 days)     If you have any further questions for your physician or physician s care team you can call [phone] and use option 3 to leave a voice message.

## 2023-12-20 NOTE — LETTER
12/20/2023         RE: Clarence Stahl  [address]  [facility] [address]        Dear Colleague,    Thank you for referring your patient, Clarence Stahl, to the [facility] [place]. Please see a copy of my visit note below.    ASSESSMENT & PLAN    Clarence was seen today for pain and pain.    Diagnoses and all orders for this visit:    Injury of left shoulder, initial encounter  -     MR Shoulder Left w/o Contrast; Future    Primary localized osteoarthrosis of right shoulder region  -     Orthopedic  Referral  -     Orthopedic  Referral; Future      This issue is acute on chronic and Worsening.        ICD-10-CM    1. Injury of left shoulder, initial encounter  S49.92XA MR Shoulder Left w/o Contrast      2. Primary localized osteoarthrosis of right shoulder region  M19.011 Orthopedic  Referral     Orthopedic  Referral          Discussed nature of shoulder osteoarthritis. Discussed symptom treatment with over-the-counter medications, ice or heat and rest if needed. Discussed physical therapy for strength. Discussed injection therapy including subacromial or glenohumeral corticosteroid injections. Also briefly discussed future consideration of referral to orthopedic surgery for further evaluation and discussion of arthroplasty.     Left Shoulder concern for rotator cuff tear, will obtain MRI.    Plan:  - Today's Plan of Care:  MRI of the Left Shoulder - Call [phone] to schedule MRI (can send to [facility] as well)    Referral for US guided right shoulder injection    Discussed activity considerations and other supportive care including Ice/Heat, OTC and other topical medications as needed.    -We also discussed other future treatment options:  Referral to Orthopedic Surgery  Referral to Physical Therapy  Consideration of injections    Follow Up: In clinic with Dr. Brantley after MRI (wait at least 1-2 days)    Concerning signs and  symptoms were reviewed and all questions were answered at this time.    Thanks for the opportunity to participate in the care of this patient, I will keep you updated on their progress.    CC: Felicia Brantley MD [facility]  Sports Medicine Physician  [facility]    -----  Chief Complaint   Patient presents with     Right Shoulder - Pain     Left Shoulder - Pain       SUBJECTIVE  Clarence Stahl is a/an 48 year old male who is seen in consultation at the request of Felicia Ochoa M.D. for evaluation of right and left shoulder pain.     The patient is seen by themselves.  The patient is Right handed    1) Right Shoulder  Onset: many years(s) ago, more in the last year, reports insidious onset without acute precipitating event.  Location of Pain: right shoulder; AC joint, bicipital groove  Worsened by: all movements, overhead movements  Better with: nothing   Treatments tried: ice, ibuprofen, other medications: topicals, previous imaging (xray 6/9/23), and corticosteroid injection (most recent date: has had 2 injections, June 16, 2023) that provided minimal of relief, had prior US guided injections with good relief.    Associated symptoms: numbness, tingling, weakness of right shoulder, and feeling of instabilitycatching, locking     2. Left shoulder  Onset; 2 weeks ago, he was pulling venison in his chest freezer, and pulled up, felt a pop  Location; anterior, bicipital groove   Better; nothing  Treatments tried; icing, Naprosyn, previous imaging (xray 12/11/23)  Associated symptoms; swelling, numbness, tingling, weakness, instability, popping     Orthopedic/Surgical history: YES - Date: 25 years ago had torn RTC on the right, did not have surgery      REVIEW OF SYSTEMS:  Review of Systems    OBJECTIVE:  There were no vitals taken for this visit.   General: healthy, alert and in no distress  Skin: no suspicious lesions or rash.  CV: distal perfusion intact   Resp: normal  respiratory effort without conversational dyspnea   Psych: normal mood and affect  Gait: NORMAL  Neuro: Normal light sensory exam of upper extremity    Bilateral Shoulder exam    Inspection and Posture:       normal    Skin:        no visible deformities    Tender:        subacromial space bilateral       Biceps tendon bilaterally    Non Tender:       remainder of shoulder bilateral    ROM:        forward flexion 160 right, 90 left        abduction 170 right, 90 left        internal rotation gluteal bilateral       external rotation 35 bilateral       asymmetric scapular motion    Painful motions:       flexion left       elevation left    Strength:        abduction 5/5 right, 4/5 left       flexion 5/5 right, 4/5 left       internal rotation 5/5 right, 4/5 left       external rotation 5/5 right, 4/5 left    Impingement testing:       positive (+) Neer left       positive (+) Thompson left    Sensation:        normal sensation over shoulder and upper extremity       RADIOLOGY:  Final results and radiologist's interpretation, available in the [facility] health record.  Images were reviewed with the patient in the office today.  My personal interpretation of the performed imaging:     3 XR views of left shoulder reviewed 12/11/2023: no acute bony abnormality, no significant degenerative change    3 XR views of right shoulder reviewed 6/9/2023: no acute bony abnormality, moderate glenohumeral degenerative change    Reviewed UC and PCP notes along with prior orthopedic notes from 2021  Review of the result(s) of each unique test - XR             Again, thank you for allowing me to participate in the care of your patient.        Sincerely,        Jael Brantley MD

## 2024-01-03 ENCOUNTER — HOSPITAL ENCOUNTER (OUTPATIENT)
Dept: MRI IMAGING | Facility: CLINIC | Age: 49
Discharge: HOME | End: 2024-01-03
Attending: PEDIATRICS | Admitting: PEDIATRICS
Payer: COMMERCIAL

## 2024-01-03 DIAGNOSIS — S49.92XA INJURY OF LEFT SHOULDER, INITIAL ENCOUNTER: ICD-10-CM

## 2024-01-03 PROCEDURE — 73221 MRI JOINT UPR EXTREM W/O DYE: CPT | Mod: LT

## 2024-01-03 PROCEDURE — 73221 MRI JOINT UPR EXTREM W/O DYE: CPT | Mod: 26 | Performed by: RADIOLOGY

## 2024-01-12 ENCOUNTER — OFFICE VISIT (OUTPATIENT)
Dept: ORTHOPEDICS | Facility: CLINIC | Age: 49
End: 2024-01-12
Payer: COMMERCIAL

## 2024-01-12 ENCOUNTER — OFFICE VISIT (OUTPATIENT)
Dept: ORTHOPEDICS | Facility: CLINIC | Age: 49
End: 2024-01-12
Attending: PEDIATRICS
Payer: COMMERCIAL

## 2024-01-12 VITALS — BODY MASS INDEX: 50.51 KG/M2 | WEIGHT: 315 LBS

## 2024-01-12 DIAGNOSIS — M25.512 BILATERAL SHOULDER PAIN, UNSPECIFIED CHRONICITY: ICD-10-CM

## 2024-01-12 DIAGNOSIS — S49.92XD INJURY OF LEFT SHOULDER, SUBSEQUENT ENCOUNTER: Primary | ICD-10-CM

## 2024-01-12 DIAGNOSIS — M25.511 BILATERAL SHOULDER PAIN, UNSPECIFIED CHRONICITY: ICD-10-CM

## 2024-01-12 DIAGNOSIS — M67.814 TENDINOSIS OF LEFT ROTATOR CUFF: ICD-10-CM

## 2024-01-12 DIAGNOSIS — M19.012 ARTHRITIS OF LEFT ACROMIOCLAVICULAR JOINT: ICD-10-CM

## 2024-01-12 DIAGNOSIS — M19.011 PRIMARY LOCALIZED OSTEOARTHROSIS OF RIGHT SHOULDER REGION: ICD-10-CM

## 2024-01-12 DIAGNOSIS — M67.814 BICEPS TENDONOSIS OF LEFT SHOULDER: ICD-10-CM

## 2024-01-12 PROCEDURE — 99213 OFFICE O/P EST LOW 20 MIN: CPT | Mod: 25 | Performed by: PEDIATRICS

## 2024-01-12 PROCEDURE — 20610 DRAIN/INJ JOINT/BURSA W/O US: CPT | Mod: LT | Performed by: PEDIATRICS

## 2024-01-12 PROCEDURE — 20611 DRAIN/INJ JOINT/BURSA W/US: CPT | Mod: RT | Performed by: FAMILY MEDICINE

## 2024-01-12 RX ORDER — TRIAMCINOLONE ACETONIDE 40 MG/ML
40 INJECTION, SUSPENSION INTRA-ARTICULAR; INTRAMUSCULAR
Status: SHIPPED | OUTPATIENT
Start: 2024-01-12

## 2024-01-12 RX ORDER — LIDOCAINE HYDROCHLORIDE 10 MG/ML
2 INJECTION, SOLUTION INFILTRATION; PERINEURAL
Status: SHIPPED | OUTPATIENT
Start: 2024-01-12

## 2024-01-12 RX ADMIN — LIDOCAINE HYDROCHLORIDE 2 ML: 10 INJECTION, SOLUTION INFILTRATION; PERINEURAL at 15:17

## 2024-01-12 RX ADMIN — ROPIVACAINE HYDROCHLORIDE 4 ML: 5 INJECTION, SOLUTION EPIDURAL; INFILTRATION; PERINEURAL at 15:23

## 2024-01-12 RX ADMIN — TRIAMCINOLONE ACETONIDE 40 MG: 40 INJECTION, SUSPENSION INTRA-ARTICULAR; INTRAMUSCULAR at 15:17

## 2024-01-12 RX ADMIN — BETAMETHASONE SODIUM PHOSPHATE AND BETAMETHASONE ACETATE 30 MG: 3; 3 INJECTION, SUSPENSION INTRA-ARTICULAR; INTRALESIONAL; INTRAMUSCULAR; SOFT TISSUE at 15:23

## 2024-01-12 NOTE — PROGRESS NOTES
ASSESSMENT & PLAN    Clarence was seen today for pain, follow up, pain, follow up and mri transcription.    Diagnoses and all orders for this visit:    Injury of left shoulder, subsequent encounter  -     Physical Therapy Referral; Future    Tendinosis of left rotator cuff  -     Physical Therapy Referral; Future    Arthritis of left acromioclavicular joint    Biceps tendonosis of left shoulder    Bilateral shoulder pain  -     Physical Therapy Referral; Future    Other orders  -     Large Joint Injection/Arthocentesis: L subacromial bursa      This issue is acute and Unchanged.      ICD-10-CM    1. Injury of left shoulder, subsequent encounter  S49.92XD Physical Therapy Referral      2. Tendinosis of left rotator cuff  M67.814 Physical Therapy Referral      3. Arthritis of left acromioclavicular joint  M19.012       4. Biceps tendonosis of left shoulder  M67.814       5. Bilateral shoulder pain  M25.511 Physical Therapy Referral    M25.512         Patient Instructions   We discussed the following treatment options: symptom treatment, activity modification/rest, injection, rehab and referral. Following discussion, plan:    Plan:  - Today's Plan of Care:  Steroid injection of the left shoulder: subacromial space was performed today in clinic  Icing for the next 1-2 days may be helpful for pain. Injection may take 10-14 days to see the full effect.    Physical Therapy - External referral    -We also discussed other future treatment options:  Referral to Orthopedic Surgery referral  US guided left biceps injection    Follow Up: 6 - 8 weeks    Concerning signs and symptoms were reviewed and all questions were answered at this time.    Jael Brantley MD [facility]  Sports Medicine Physician  [facility]    SUBJECTIVE- Interim History January 12, 2024    Chief Complaint   Patient presents with    Right Shoulder - Pain, Follow Up    Left Shoulder - Pain, Follow Up, MRI Transcription       Clarence Stahl is a 48  year old male who is seen in f/u up for    Injury of left shoulder, subsequent encounter  Tendinosis of left rotator cuff  Arthritis of left acromioclavicular joint  Biceps tendonosis of left shoulder  Bilateral shoulder pain.  Since last visit on 12/20/23 patient has been not doing very well. He is having a lot of trouble sleeping. Here to review MRI results. Both shoulders are painful, left worse.     Orthopedic/Surgical history: YES - Date: 25 years ago had torn RTC on the right, did not have surgery    REVIEW OF SYSTEMS:  Review of Systems    OBJECTIVE:  Wt (!) 159.7 kg (352 lb)   BMI 50.51 kg/m     General: healthy, alert and in no distress  Skin: no suspicious lesions or rash.  CV: distal perfusion intact   Resp: normal respiratory effort without conversational dyspnea   Psych: normal mood and affect  Gait: NORMAL  Neuro: Normal light sensory exam of upper extremity     Bilateral Shoulder exam  Inspection and Posture:       normal     Skin:        no visible deformities     Tender:        subacromial space bilateral       Biceps tendon bilaterally     Non Tender:       remainder of shoulder bilateral     ROM:        forward flexion 160 bilateral       abduction 160 bilateral       internal rotation gluteal bilateral       external rotation 35 bilateral       asymmetric scapular motion     Painful motions:       flexion left       elevation left     Strength:        abduction 5/5 right, 4/5 left       flexion 5/5 right, 4/5 left       internal rotation 5/5 right, 4/5 left       external rotation 5/5 right, 4/5 left     Impingement testing:       positive (+) Neer left       positive (+) Thompson left     Sensation:        normal sensation over shoulder and upper extremity    RADIOLOGY:  Final results and radiologist's interpretation, available in the [facility] health record.  Images were reviewed with the patient in the office today.  My personal interpretation of the performed imaging:     MRI Left Shoulder  1/3/2024 - severe tendinosis biceps tendinosis with tearing, moderate severe AC joint arthritis, RC tendinosis with low grade tearing    Results for orders placed or performed during the hospital encounter of 01/03/24   MR Shoulder Left w/o Contrast    Narrative    EXAM: MR left shoulder without  contrast 1/3/2024 8:53 PM    TECHNIQUE: Multiplanar, multisequence imaging of the left shoulder  were obtained without administration of intravenous or intra-articular  gadolinium contrast using routine protocol.    History: eval left rotator cuff; Injury of left shoulder, initial  encounter     Comparison: Left shoulder x-ray 1223    Findings:    ROTATOR CUFF and ASSOCIATED STRUCTURES  Rotator cuff: Supraspinatus tendinosis with low-grade intrasubstance  tear of the middle to posterior tendon at the footprint (series 6,  image 11). Infraspinatus tendinosis without focal tear. Severe  tendinosis of the subscapularis tendon without focal tear. The teres  minor tendon is intact.    Bursa: Mild subacromial bursal fluid.    Musculature: Muscle bulk of rotator cuff is preserved.  Deltoid muscle  bulk is also preserved.  No muscle edema.    Acromioclavicular joint  There are moderate to severe degenerative changes of the  acromioclavicular joint. Acromion is type 2 in sagittal morphology.   Coracoacromial ligament is not thickened.    OSSEOUS STRUCTURES  No fracture, marrow contusion or marrow infiltration. Diffuse red  marrow throughout the humeral diaphysis and scapula.    LONG BICIPITAL TENDON  Severe tendinosis of the extra-articular long head biceps tendon with  complete tear at the level of the upper bicipital groove. Marked  tendinosis of the bicipital labral and pulley segments of the biceps  tendon.    GLENOHUMERAL JOINT  Joint fluid: Physiologic amount of joint fluid is present.     Cartilage and subarticular bone:  Full-thickness cartilage fissure  along the posterior superior glenoid surface (series 4, image 12).  No  Hill-Sachs, reverse Hill-Sachs, or bony Bankart lesions are seen.    Labrum: Limited assessment on this study with relative lack of joint  distention demonstrates complex tearing of the anterior to posterior  superior labrum.    ANCILLARY FINDINGS:  Significant edema throughout the rotator interval.      Impression    Impression:    1. Severe tendinosis of the extra-articular long head biceps tendon  with complete tear at the level of the upper bicipital groove.     2. Marked rotator cuff tendinosis with low-grade intrasubstance tear  of the middle/posterior supraspinatus tendon at the footprint. No  full-thickness tear or tendon retraction.    3. Moderate to severe degenerative changes of the acromioclavicular  joint. Mild subacromial bursitis.    4. Complex tearing of the anterior to posterior superior labrum.    5. Relatively prominent for age red marrow reconversion, nonspecific  but can be seen in the setting of anemia, smoking, obesity and other  etiologies.    I have personally reviewed the examination and initial interpretation  and I agree with the findings.    NATHANAEL MORENO MD (Joe)         SYSTEM ID:  [id number]         Review of the result(s) of each unique test - MRI       Large Joint Injection/Arthocentesis: L subacromial bursa    Date/Time: 1/12/2024 3:17 PM    Performed by: Jael Brantley MD  Authorized by: Jael Brantley MD    Indications:  Pain  Needle Size:  25 G  Guidance: landmark guided    Approach:  Posterior  Location:  Shoulder      Site:  L subacromial bursa  Medications:  2 mL lidocaine 1 %; 40 mg triamcinolone 40 MG/ML  Outcome:  Tolerated well, no immediate complications  Procedure discussed: discussed risks, benefits, and alternatives    Consent Given by:  Patient  Timeout: timeout called immediately prior to procedure    Prep: patient was prepped and draped in usual sterile fashion     The risks, benefits and complications of steroid injection were discussed with the  patient (including but not limited to: bleeding, infection, pain, scar, damage to adjacent structures, atrophy or necrosis of soft tissue, skin blanching, failure to relieve symptoms, worsening of symptoms, allergic reaction). After this discussion all questions were addressed and answered and the patient elected to proceed. The patient tolerated the procedure well without complications.  Also discussed that if diabetic, recommend close monitoring of blood sugars over the next week as cortisone injections can temporarily elevate blood sugars.

## 2024-01-12 NOTE — PATIENT INSTRUCTIONS
[facility] [place]    Procedure: Right shoulder joint steroid injection     You may shower, however avoid swimming, tub baths or hot tubs for 24 hours following your procedure  You may have a mild to moderate increase in pain for several days following the injection.  It may take up to 14 days for the steroid medication to start working although you may feel the effect as early as a few days after the procedure.  You may use ice packs for 10-15 minutes, 3 to 4 times a day at the injection site for comfort  You may use anti-inflammatory medications (such as Ibuprofen or Aleve or Advil) or Tylenol for pain control if necessary  If you were fasting, you may resume your normal diet and medications after the procedure  If you have diabetes, check your blood sugar more frequently than usual as your blood sugar may be higher than normal for 10-14 days following a steroid injection. Contact your doctor who manages your diabetes if your blood sugar is higher than usual    If you experience any of the following, call [facility] @ [phone] or [phone]  -Fever over 100 degree F  -Swelling, bleeding, redness, drainage, warmth at the injection site  - New or worsening pain     It was great seeing you today!    Dipak Gaytan

## 2024-01-12 NOTE — LETTER
1/12/2024         RE: Clarence Stahl  [address]  [place] [address]        Dear Colleague,    Thank you for referring your patient, Clarence Stahl, to the [facility] [place]. Please see a copy of my visit note below.    ASSESSMENT & PLAN    Clarence was seen today for pain, follow up, pain, follow up and mri transcription.    Diagnoses and all orders for this visit:    Injury of left shoulder, subsequent encounter  -     Physical Therapy Referral; Future    Tendinosis of left rotator cuff  -     Physical Therapy Referral; Future    Arthritis of left acromioclavicular joint    Biceps tendonosis of left shoulder    Bilateral shoulder pain  -     Physical Therapy Referral; Future    Other orders  -     Large Joint Injection/Arthocentesis: L subacromial bursa      This issue is acute and Unchanged.      ICD-10-CM    1. Injury of left shoulder, subsequent encounter  S49.92XD Physical Therapy Referral      2. Tendinosis of left rotator cuff  M67.814 Physical Therapy Referral      3. Arthritis of left acromioclavicular joint  M19.012       4. Biceps tendonosis of left shoulder  M67.814       5. Bilateral shoulder pain  M25.511 Physical Therapy Referral    M25.512         Patient Instructions   We discussed the following treatment options: symptom treatment, activity modification/rest, injection, rehab and referral. Following discussion, plan:    Plan:  - Today's Plan of Care:  Steroid injection of the left shoulder: subacromial space was performed today in clinic  Icing for the next 1-2 days may be helpful for pain. Injection may take 10-14 days to see the full effect.    Physical Therapy - External referral    -We also discussed other future treatment options:  Referral to Orthopedic Surgery referral  US guided left biceps injection    Follow Up: 6 - 8 weeks    Concerning signs and symptoms were reviewed and all questions were answered at this time.    Jael Brantley MD  CAQ  Sports Medicine Physician  [facility]    SUBJECTIVE- Interim History January 12, 2024    Chief Complaint   Patient presents with     Right Shoulder - Pain, Follow Up     Left Shoulder - Pain, Follow Up, MRI Transcription       Clarence Stahl is a 48 year old male who is seen in f/u up for    Injury of left shoulder, subsequent encounter  Tendinosis of left rotator cuff  Arthritis of left acromioclavicular joint  Biceps tendonosis of left shoulder  Bilateral shoulder pain.  Since last visit on 12/20/23 patient has been not doing very well. He is having a lot of trouble sleeping. Here to review MRI results. Both shoulders are painful, left worse.     Orthopedic/Surgical history: YES - Date: 25 years ago had torn RTC on the right, did not have surgery    REVIEW OF SYSTEMS:  Review of Systems    OBJECTIVE:  Wt (!) 159.7 kg (352 lb)   BMI 50.51 kg/m     General: healthy, alert and in no distress  Skin: no suspicious lesions or rash.  CV: distal perfusion intact   Resp: normal respiratory effort without conversational dyspnea   Psych: normal mood and affect  Gait: NORMAL  Neuro: Normal light sensory exam of upper extremity     Bilateral Shoulder exam  Inspection and Posture:       normal     Skin:        no visible deformities     Tender:        subacromial space bilateral       Biceps tendon bilaterally     Non Tender:       remainder of shoulder bilateral     ROM:        forward flexion 160 bilateral       abduction 160 bilateral       internal rotation gluteal bilateral       external rotation 35 bilateral       asymmetric scapular motion     Painful motions:       flexion left       elevation left     Strength:        abduction 5/5 right, 4/5 left       flexion 5/5 right, 4/5 left       internal rotation 5/5 right, 4/5 left       external rotation 5/5 right, 4/5 left     Impingement testing:       positive (+) Neer left       positive (+) Thompson left     Sensation:        normal sensation  over shoulder and upper extremity    RADIOLOGY:  Final results and radiologist's interpretation, available in the [facility] health record.  Images were reviewed with the patient in the office today.  My personal interpretation of the performed imaging:     MRI Left Shoulder 1/3/2024 - severe tendinosis biceps tendinosis with tearing, moderate severe AC joint arthritis, RC tendinosis with low grade tearing    Results for orders placed or performed during the hospital encounter of 01/03/24   MR Shoulder Left w/o Contrast    Narrative    EXAM: MR left shoulder without  contrast 1/3/2024 8:53 PM    TECHNIQUE: Multiplanar, multisequence imaging of the left shoulder  were obtained without administration of intravenous or intra-articular  gadolinium contrast using routine protocol.    History: eval left rotator cuff; Injury of left shoulder, initial  encounter     Comparison: Left shoulder x-ray 1223    Findings:    ROTATOR CUFF and ASSOCIATED STRUCTURES  Rotator cuff: Supraspinatus tendinosis with low-grade intrasubstance  tear of the middle to posterior tendon at the footprint (series 6,  image 11). Infraspinatus tendinosis without focal tear. Severe  tendinosis of the subscapularis tendon without focal tear. The teres  minor tendon is intact.    Bursa: Mild subacromial bursal fluid.    Musculature: Muscle bulk of rotator cuff is preserved.  Deltoid muscle  bulk is also preserved.  No muscle edema.    Acromioclavicular joint  There are moderate to severe degenerative changes of the  acromioclavicular joint. Acromion is type 2 in sagittal morphology.   Coracoacromial ligament is not thickened.    OSSEOUS STRUCTURES  No fracture, marrow contusion or marrow infiltration. Diffuse red  marrow throughout the humeral diaphysis and scapula.    LONG BICIPITAL TENDON  Severe tendinosis of the extra-articular long head biceps tendon with  complete tear at the level of the upper bicipital groove. Marked  tendinosis of the bicipital  labral and pulley segments of the biceps  tendon.    GLENOHUMERAL JOINT  Joint fluid: Physiologic amount of joint fluid is present.     Cartilage and subarticular bone:  Full-thickness cartilage fissure  along the posterior superior glenoid surface (series 4, image 12). No  Hill-Sachs, reverse Hill-Sachs, or bony Bankart lesions are seen.    Labrum: Limited assessment on this study with relative lack of joint  distention demonstrates complex tearing of the anterior to posterior  superior labrum.    ANCILLARY FINDINGS:  Significant edema throughout the rotator interval.      Impression    Impression:    1. Severe tendinosis of the extra-articular long head biceps tendon  with complete tear at the level of the upper bicipital groove.     2. Marked rotator cuff tendinosis with low-grade intrasubstance tear  of the middle/posterior supraspinatus tendon at the footprint. No  full-thickness tear or tendon retraction.    3. Moderate to severe degenerative changes of the acromioclavicular  joint. Mild subacromial bursitis.    4. Complex tearing of the anterior to posterior superior labrum.    5. Relatively prominent for age red marrow reconversion, nonspecific  but can be seen in the setting of anemia, smoking, obesity and other  etiologies.    I have personally reviewed the examination and initial interpretation  and I agree with the findings.    NATHANAEL MORENO MD (Joe)         SYSTEM ID:  [id number]         Review of the result(s) of each unique test - MRI       Large Joint Injection/Arthocentesis: L subacromial bursa    Date/Time: 1/12/2024 3:17 PM    Performed by: Jael Brantley MD  Authorized by: Jael Brantley MD    Indications:  Pain  Needle Size:  25 G  Guidance: landmark guided    Approach:  Posterior  Location:  Shoulder      Site:  L subacromial bursa  Medications:  2 mL lidocaine 1 %; 40 mg triamcinolone 40 MG/ML  Outcome:  Tolerated well, no immediate complications  Procedure discussed: discussed risks,  benefits, and alternatives    Consent Given by:  Patient  Timeout: timeout called immediately prior to procedure    Prep: patient was prepped and draped in usual sterile fashion     The risks, benefits and complications of steroid injection were discussed with the patient (including but not limited to: bleeding, infection, pain, scar, damage to adjacent structures, atrophy or necrosis of soft tissue, skin blanching, failure to relieve symptoms, worsening of symptoms, allergic reaction). After this discussion all questions were addressed and answered and the patient elected to proceed. The patient tolerated the procedure well without complications.  Also discussed that if diabetic, recommend close monitoring of blood sugars over the next week as cortisone injections can temporarily elevate blood sugars.        Again, thank you for allowing me to participate in the care of your patient.        Sincerely,        Jael Brantley MD

## 2024-01-12 NOTE — PROGRESS NOTES
Large Joint Injection/Arthocentesis: R glenohumeral joint    Date/Time: 1/12/2024 3:23 PM    Performed by: Dipak Gaytan MD  Authorized by: Dipak Gaytan MD    Indications:  Pain and osteoarthritis  Needle Size:  25 G  Guidance: ultrasound    Approach:  Posterolateral  Location:  Shoulder      Site:  R glenohumeral joint  Medications:  30 mg betamethasone acet & sod phos 6 (3-3) MG/ML; 4 mL ROPivacaine 5 MG/ML  Outcome:  Tolerated well, no immediate complications  Procedure discussed: discussed risks, benefits, and alternatives    Consent Given by:  Patient  Timeout: timeout called immediately prior to procedure    Prep: patient was prepped and draped in usual sterile fashion     Ultrasound images of procedure were permanently stored.     Patient reported improvement of pain after the numbing portion right glenohumeral joint steroid injection.  Ultrasound guided images were permanently stored.  Aftercare instructions given to patient.  Plan to follow-up as previously discussed with referring provider.     Dipak Gaytan MD [facility]

## 2024-01-12 NOTE — LETTER
1/12/2024         RE: Clarence Sthal  [address] [place] [address]        Dear Colleague,    Thank you for referring your patient, Clarence Stahl, to the [facility] [place]. Please see a copy of my visit note below.    Large Joint Injection/Arthocentesis: R glenohumeral joint    Date/Time: 1/12/2024 3:23 PM    Performed by: Dipak Gaytan MD  Authorized by: Dipak Gaytan MD    Indications:  Pain and osteoarthritis  Needle Size:  25 G  Guidance: ultrasound    Approach:  Posterolateral  Location:  Shoulder      Site:  R glenohumeral joint  Medications:  30 mg betamethasone acet & sod phos 6 (3-3) MG/ML; 4 mL ROPivacaine 5 MG/ML  Outcome:  Tolerated well, no immediate complications  Procedure discussed: discussed risks, benefits, and alternatives    Consent Given by:  Patient  Timeout: timeout called immediately prior to procedure    Prep: patient was prepped and draped in usual sterile fashion     Ultrasound images of procedure were permanently stored.     Patient reported improvement of pain after the numbing portion right glenohumeral joint steroid injection.  Ultrasound guided images were permanently stored.  Aftercare instructions given to patient.  Plan to follow-up as previously discussed with referring provider.     Dipak Gaytan MD [facility]            Again, thank you for allowing me to participate in the care of your patient.        Sincerely,        Dipak Gaytan MD

## 2024-01-12 NOTE — PATIENT INSTRUCTIONS
We discussed the following treatment options: symptom treatment, activity modification/rest, injection, rehab and referral. Following discussion, plan:    Plan:  - Today's Plan of Care:  Steroid injection of the left shoulder: subacromial space was performed today in clinic  Icing for the next 1-2 days may be helpful for pain. Injection may take 10-14 days to see the full effect.    Physical Therapy - External referral    -We also discussed other future treatment options:  Referral to Orthopedic Surgery referral  US guided left biceps injection    Follow Up: 6 - 8 weeks    If you have any further questions for your physician or physician s care team you can call [phone] and use option 3 to leave a voice message.     After the Injection     After the injection, strenuous and repetitive activity should be minimized for approximately 48 hours.   Ice should be applied to the injected area at least for the next 48 hours.   Apply ice to the injected area at least 3 - 4 times a day for 20 minutes each time for the next 48 hours. This can reduce the painful  flare  reaction that can follow an injection the next day. This reaction can cause the area that was injected to hurt more the next day just from the injection. This will resolve within a day if it does occur.     Use over-the-counter pain medications such as Tylenol to help with the pain if necessary.     After 48 hours, icing the area may be continued if you find it beneficial.     The lidocaine or marcaine (commonly called Novocain) is an anesthetic agent that is injected with the steroid will typically relieve your pain for a few hours following the injection. If the  Novocain  and steroid are injected near a nerve, you may experience local numbness or weakness from the nerve block until it wears off. After this wears off your pain may return until the steroid takes effect.   The steroid may be effective immediately after the injection. Do not be concerned if  the injection is not effective in relieving your symptoms immediately. In some cases, it may take up to two weeks for the steroid to work.   If you are diabetic, the corticosteroid may cause your blood sugar to become elevated for several days following the injection. This response usually lasts about 2-4 days before it returns to your normal level.   You should report any adverse reaction to you doctor. Call if there are any questions.

## 2024-01-13 RX ORDER — ROPIVACAINE HYDROCHLORIDE 5 MG/ML
4 INJECTION, SOLUTION EPIDURAL; INFILTRATION; PERINEURAL
Status: SHIPPED | OUTPATIENT
Start: 2024-01-12

## 2024-01-13 RX ORDER — BETAMETHASONE SODIUM PHOSPHATE AND BETAMETHASONE ACETATE 3; 3 MG/ML; MG/ML
30 INJECTION, SUSPENSION INTRA-ARTICULAR; INTRALESIONAL; INTRAMUSCULAR; SOFT TISSUE
Status: SHIPPED | OUTPATIENT
Start: 2024-01-12

## 2024-01-17 ENCOUNTER — APPOINTMENT (OUTPATIENT)
Dept: GENERAL RADIOLOGY | Facility: CLINIC | Age: 49
End: 2024-01-17
Attending: FAMILY MEDICINE
Payer: COMMERCIAL

## 2024-01-17 ENCOUNTER — HOSPITAL ENCOUNTER (EMERGENCY)
Facility: CLINIC | Age: 49
Discharge: HOME | End: 2024-01-17
Attending: FAMILY MEDICINE | Admitting: FAMILY MEDICINE
Payer: COMMERCIAL

## 2024-01-17 VITALS
SYSTOLIC BLOOD PRESSURE: 146 MMHG | BODY MASS INDEX: 50.51 KG/M2 | WEIGHT: 315 LBS | RESPIRATION RATE: 20 BRPM | DIASTOLIC BLOOD PRESSURE: 101 MMHG | HEART RATE: 85 BPM | TEMPERATURE: 98.9 F | OXYGEN SATURATION: 93 %

## 2024-01-17 DIAGNOSIS — J10.1 INFLUENZA A: ICD-10-CM

## 2024-01-17 LAB
FLUAV RNA SPEC QL NAA+PROBE: POSITIVE
FLUBV RNA RESP QL NAA+PROBE: NEGATIVE
RSV RNA SPEC NAA+PROBE: NEGATIVE
SARS-COV-2 RNA RESP QL NAA+PROBE: NEGATIVE

## 2024-01-17 PROCEDURE — 99284 EMERGENCY DEPT VISIT MOD MDM: CPT | Mod: 25 | Performed by: FAMILY MEDICINE

## 2024-01-17 PROCEDURE — 71046 X-RAY EXAM CHEST 2 VIEWS: CPT

## 2024-01-17 PROCEDURE — 87637 SARSCOV2&INF A&B&RSV AMP PRB: CPT | Performed by: FAMILY MEDICINE

## 2024-01-17 PROCEDURE — 99283 EMERGENCY DEPT VISIT LOW MDM: CPT | Performed by: FAMILY MEDICINE

## 2024-01-17 ASSESSMENT — ACTIVITIES OF DAILY LIVING (ADL): ADLS_ACUITY_SCORE: 35

## 2024-01-17 NOTE — ED TRIAGE NOTES
Pt states he is having SOB, fever and chills, cough.  COVID neg at home.  Pt is not immunized for flu, covid or RSV.   Pt taking ibuprofen, last dose was at 3:30.

## 2024-01-18 NOTE — ED PROVIDER NOTES
History     Chief Complaint   Patient presents with    Cough    Nasal Congestion     HPI  Clarence Stahl is a 48 year old male who comes in with his wife with a cough, fever, congestion, myalgias that began 4 days ago.  2 days ago he was not able to go to work because of the symptoms.  He has been coughing up sputum.  He feels somewhat short of breath.  He does not smoke and does not have a history of chronic lung disease.  He tells me he does not take any prescription medications.  He did not have influenza vaccination.  He had a home COVID test that was negative.    Allergies:  Allergies   Allergen Reactions    No Known Allergies        Problem List:    Patient Active Problem List    Diagnosis Date Noted    Primary localized osteoarthrosis of right shoulder region 01/12/2024     Priority: Medium    Injury of left shoulder, subsequent encounter 01/12/2024     Priority: Medium    Type II diabetes mellitus (H) 11/23/2021     Priority: Medium    Carpal tunnel syndrome, bilateral 10/04/2019     Priority: Medium    Cubital tunnel syndrome of both upper extremities 10/04/2019     Priority: Medium    Medial epicondylitis of right elbow 10/04/2019     Priority: Medium    Medial epicondylitis, left 10/04/2019     Priority: Medium    Osteoarthritis of left knee 10/12/2012     Priority: Medium     Formatting of this note might be different from the original.  S/p ACL repair in 1995, and arthroscopy in 1997 and 2007.      Displacement of lumbar intervertebral disc without myelopathy 04/03/2012     Priority: Medium    Lateral epicondylitis 10/20/2010     Priority: Medium     Formatting of this note might be different from the original.  Bilateral elbows          Past Medical History:    No past medical history on file.    Past Surgical History:    Past Surgical History:   Procedure Laterality Date    ORTHOPEDIC SURGERY         Family History:    No family history on file.    Social History:  Marital Status:    [2]  Social History     Tobacco Use    Smoking status: Never    Smokeless tobacco: Never   Substance Use Topics    Alcohol use: Not Currently    Drug use: Never        Medications:    acetaminophen (TYLENOL) 500 MG tablet  Ascorbic Acid (VITAMIN C PO)  MOUNJARO 10 MG/0.5ML pen          Review of Systems  All other systems are reviewed and are negative    Physical Exam   BP: (!) 122/90  Pulse: 94  Temp: 98.9  F (37.2  C)  Resp: 20  Weight: (!) 159.7 kg (352 lb)  SpO2: 95 %      Physical Exam    Nursing note and vitals were reviewed.  Constitutional: Awake and alert, adequately nourished and developed appearing 48-year-old in no apparent discomfort, who moderately ill but nontoxic, and who answers questions appropriately and cooperates with examination.  HEENT: Voice quality is normal.  PERRL.  EOMI.   Neck: Freely mobile.  Cardiovascular: Cardiac examination reveals normal heart rate and regular rhythm without murmur.  Pulmonary/Chest: Breathing is unlabored.  Breath sounds are clear and equal bilaterally.  There no retractions, tachypnea, rales, wheezes, or rhonchi.  Neurological: Alert, oriented, thought content logical, coherent   Psychiatric: Affect broad and appropriate.    ED Course                 Procedures              Critical Care time:  none               Results for orders placed or performed during the hospital encounter of 01/17/24 (from the past 24 hour(s))   Symptomatic Influenza A/B, RSV, & SARS-CoV2 PCR (COVID-19) Nose    Specimen: Nose; Swab   Result Value Ref Range    Influenza A PCR Positive (A) Negative    Influenza B PCR Negative Negative    RSV PCR Negative Negative    SARS CoV2 PCR Negative Negative    Narrative    Testing was performed using the Xpert Xpress CoV2/Flu/RSV Assay on the [employer] GeneXpert Instrument. This test should be ordered for the detection of SARS-CoV-2, influenza, and RSV viruses in individuals who meet clinical and/or epidemiological criteria. Test performance is  unknown in asymptomatic patients. This test is for in vitro diagnostic use under the FDA EUA for laboratories certified under CLIA to perform high or moderate complexity testing. This test has not been FDA cleared or approved. A negative result does not rule out the presence of PCR inhibitors in the specimen or target RNA in concentration below the limit of detection for the assay. If only one viral target is positive but coinfection with multiple targets is suspected, the sample should be re-tested with another FDA cleared, approved, or authorized test, if coinfection would change clinical management. This test was validated by the [facility] [facility]. These laboratories are certified under the Clinical Laboratory Improvement Amendments of 1988 (CLIA-88) as qualified to perform high complexity laboratory testing.   XR Chest 2 Views    Narrative    EXAM: XR CHEST 2 VIEWS  LOCATION: [facility]  DATE: 1/17/2024    INDICATION: Cough.  COMPARISON: None available.      Impression    IMPRESSION: No focal airspace consolidation. No pleural effusion or pneumothorax.    Cardiomediastinal silhouette is normal.       Medications - No data to display    Assessments & Plan (with Medical Decision Making)     48-year-old male with no significant identified medical problems presented with influenza-like illness for 5 days.  Rapid testing was positive for influenza A and negative for RSV and COVID.  A chest x-ray was normal.  His physical examination was significant for normal oxygenation and no increased work of breathing.  We discussed the diagnosis and test results and the expected clinical course and symptomatic care.  No indication for antiviral therapy in him.  Return to be seen if worsening with persistent fevers, difficulty breathing, or other new concerning symptoms although I do not anticipate this occurring.  He and his wife expressed understanding and their questions were  answered.    I have reviewed the nursing notes.    I have reviewed the findings, diagnosis, plan and need for follow up with the patient.       New Prescriptions    No medications on file       Final diagnoses:   Influenza A       1/17/2024   [facility] EMERGENCY DEPT       Jimbo Oquendo MD  01/17/24 1939

## 2024-01-18 NOTE — DISCHARGE INSTRUCTIONS
You can use acetaminophen 1000 mg 4 times per day if needed for fever or pain.  You can add ibuprofen 400 to 600 mg 4 times per day if needed.  You should return to be seen if you suddenly have recurrence of fever or develop significant pain but this is unlikely to occur.

## 2024-02-16 ENCOUNTER — TELEPHONE (OUTPATIENT)
Dept: URGENT CARE | Facility: URGENT CARE | Age: 49
End: 2024-02-16
Payer: COMMERCIAL

## 2024-02-16 NOTE — TELEPHONE ENCOUNTER
Received [employer] forms from patient. It has been filled out and faxed back to [employer] at [phone], all 10 pages from pt including office visit note and xray note, total of 13 pages has been faxed at 340 p.m. A copy of the form has been sent to scanning. I also spoke with Clarence and Boogie regarding the form. They do not need a copy.

## 2024-04-07 ENCOUNTER — HOSPITAL ENCOUNTER (OUTPATIENT)
Dept: MRI IMAGING | Facility: CLINIC | Age: 49
Discharge: HOME | End: 2024-04-07
Attending: PEDIATRICS | Admitting: PEDIATRICS
Payer: COMMERCIAL

## 2024-04-07 DIAGNOSIS — M19.011 PRIMARY LOCALIZED OSTEOARTHROSIS OF RIGHT SHOULDER REGION: ICD-10-CM

## 2024-04-07 PROCEDURE — 73221 MRI JOINT UPR EXTREM W/O DYE: CPT | Mod: RT

## 2024-04-07 PROCEDURE — 73221 MRI JOINT UPR EXTREM W/O DYE: CPT | Mod: 26 | Performed by: RADIOLOGY

## 2024-04-09 ENCOUNTER — MYC MEDICAL ADVICE (OUTPATIENT)
Dept: ORTHOPEDICS | Facility: CLINIC | Age: 49
End: 2024-04-09
Payer: COMMERCIAL

## 2024-04-09 DIAGNOSIS — M25.511 BILATERAL SHOULDER PAIN, UNSPECIFIED CHRONICITY: Primary | ICD-10-CM

## 2024-04-09 DIAGNOSIS — M19.012 ARTHRITIS OF LEFT ACROMIOCLAVICULAR JOINT: ICD-10-CM

## 2024-04-09 DIAGNOSIS — M67.814 TENDINOSIS OF LEFT ROTATOR CUFF: ICD-10-CM

## 2024-04-09 DIAGNOSIS — S49.92XD INJURY OF LEFT SHOULDER, SUBSEQUENT ENCOUNTER: ICD-10-CM

## 2024-04-09 DIAGNOSIS — M19.011 PRIMARY LOCALIZED OSTEOARTHROSIS OF RIGHT SHOULDER REGION: ICD-10-CM

## 2024-04-09 DIAGNOSIS — M25.512 BILATERAL SHOULDER PAIN, UNSPECIFIED CHRONICITY: Primary | ICD-10-CM

## 2024-04-09 NOTE — TELEPHONE ENCOUNTER
Narrative & Impression   Exam: MRI of the right shoulder dated 4/7/2024.     COMPARISON: Radiographs dated 6/9/2023.     CLINICAL HISTORY: Osteoarthrosis.     TECHNIQUE: Multiplanar, multisequence MR imaging of the right shoulder  was obtained using standard sequences in 3 orthogonal planes without  the use of intravenous or intra-articular gadolinium contrast.     FINDINGS:     Artifact, mildly limiting evaluation.     Small amount of fluid at the right shoulder glenohumeral joint. Small  amount of fluid in the subscapularis recess and subacromial subdeltoid  bursa.     No marrow signal abnormalities to suggest fracture or marrow  infiltration.     Moderate osteoarthrosis at the acromioclavicular joint. No os  acromiale. The coracohumeral, coracoclavicular, and coracoacromial  ligaments are intact.     Marked tendinosis of the supraspinatus, infraspinatus, and  subscapularis tendons without full-thickness tear or tendon  retraction. Low to moderate grade intrasubstance partial thickness  tearing of the posterior supraspinatus tendon, with low-grade  articular sided partial thickness tearing of the anterior  infraspinatus tendon. The teres minor tendon is intact. Marked  tendinosis of the subscapularis tendon.     The muscle bulk of the rotator cuff musculature is intact without  significant atrophy or soft tissue edema.     There is tendinosis of the biceps tendon within the bicipital groove.  Tendinosis with partial thickness tear of the intra-articular portion  of the biceps tendon.     Humeral head is well aligned with the glenoid. No Hill-Sachs lesion.  Severe osteoarthrosis involving the glenohumeral joint with biconcave  glenoid with marked full-thickness cartilage loss, greatest along the  posterior glenoid. There is mild subchondral bone marrow edema and  cystic changes. Spurring along the inferomedial humeral head and  degenerative diffuse labral tearing.                                                                       IMPRESSION:  1. Moderate osteoarthrosis at the right shoulder acromioclavicular  joint.     2. Severe osteoarthrosis at the right shoulder glenohumeral joint with  biconcave glenoid with areas of full-thickness cartilage loss,  subchondral bone marrow edema and cystic changes, osteophytic spurring  along the inferomedial humeral head, and marked degenerative labral  tearing.     3. Severe tendinosis of the right shoulder rotator cuff tendons  without full-thickness tear or tendon retraction. There is low to  moderate grade intrasubstance partial thickness tearing of the  posterior supraspinatus tendon with low-grade articular sided partial  thickness tearing of the infraspinatus tendon.     4. Normal muscle bulk of the right shoulder rotator cuff musculature.     5. Tendinosis with partial-thickness tearing of the biceps tendon.        SOTERO SINGER MD

## 2024-04-09 NOTE — TELEPHONE ENCOUNTER
I replied directly to the patient with his imaging results.  Let me know if additional questions.  Jael Brantley MD

## 2024-04-09 NOTE — RESULT ENCOUNTER NOTE
Reviewed and notified of results via [employer].  Fadi Lima,  Please see the results of your MRI.  It confirms severe shoulder arthritis. There is also rotator cuff tendinosis without full thickness tearing.  You last received an US guided right shoulder injection with Dr. Gaytan on January 12th and we would recommend injections no more than every 3-4 months.    Let us know if you'd like a referral for another US guided injection.  We could also consider physical therapy or referral to orthopedic surgery.    You could also review this in more detail at a follow-up appointment with me.  Let us know if you have questions.    Jael Brantley MD, CAQ  Primary Care Sports Medicine  [facility]

## 2024-04-10 ENCOUNTER — MYC MEDICAL ADVICE (OUTPATIENT)
Dept: ORTHOPEDICS | Facility: CLINIC | Age: 49
End: 2024-04-10
Payer: COMMERCIAL

## 2024-04-10 ENCOUNTER — TELEPHONE (OUTPATIENT)
Dept: ORTHOPEDICS | Facility: CLINIC | Age: 49
End: 2024-04-10
Payer: COMMERCIAL

## 2024-04-10 NOTE — TELEPHONE ENCOUNTER
Spoke with Clarence and explained that only 2 injection areas may be done in one visit, also Dr. Brantley has not seen patient for Left elbow therefore would be unable to place order as requested. Patient expressed understanding and will address with Dr. Hopkins at upcoming appt.    Justa Mcclure [facility]    Meaghan Uribe

## 2024-04-10 NOTE — TELEPHONE ENCOUNTER
Spoke with Clarence and explained that only 2 injection areas may be done in one visit, also Dr. Brantley has not seen patient for Left elbow therefore would be unable to place order as requested. Patient expressed understanding and will address with Dr. Hopkins at upcoming appt.     Justa Mcclure [facility]    Meaghan Uribe

## 2024-04-14 ENCOUNTER — HEALTH MAINTENANCE LETTER (OUTPATIENT)
Age: 49
End: 2024-04-14

## 2024-04-15 ENCOUNTER — OFFICE VISIT (OUTPATIENT)
Dept: ORTHOPEDICS | Facility: CLINIC | Age: 49
End: 2024-04-15
Attending: PEDIATRICS
Payer: COMMERCIAL

## 2024-04-15 VITALS
DIASTOLIC BLOOD PRESSURE: 82 MMHG | HEIGHT: 70 IN | WEIGHT: 315 LBS | BODY MASS INDEX: 45.1 KG/M2 | SYSTOLIC BLOOD PRESSURE: 134 MMHG

## 2024-04-15 DIAGNOSIS — M19.012 ARTHRITIS OF LEFT ACROMIOCLAVICULAR JOINT: ICD-10-CM

## 2024-04-15 DIAGNOSIS — M25.511 BILATERAL SHOULDER PAIN, UNSPECIFIED CHRONICITY: ICD-10-CM

## 2024-04-15 DIAGNOSIS — S49.92XD INJURY OF LEFT SHOULDER, SUBSEQUENT ENCOUNTER: ICD-10-CM

## 2024-04-15 DIAGNOSIS — M19.011 PRIMARY LOCALIZED OSTEOARTHROSIS OF RIGHT SHOULDER REGION: ICD-10-CM

## 2024-04-15 DIAGNOSIS — M67.814 TENDINOSIS OF LEFT ROTATOR CUFF: ICD-10-CM

## 2024-04-15 DIAGNOSIS — M25.512 BILATERAL SHOULDER PAIN, UNSPECIFIED CHRONICITY: ICD-10-CM

## 2024-04-15 LAB
ANION GAP SERPL CALCULATED.3IONS-SCNC: 11 MMOL/L (ref 7–15)
BUN SERPL-MCNC: 25.1 MG/DL (ref 6–20)
CALCIUM SERPL-MCNC: 9.2 MG/DL (ref 8.6–10)
CHLORIDE SERPL-SCNC: 102 MMOL/L (ref 98–107)
CREAT SERPL-MCNC: 1.1 MG/DL (ref 0.67–1.17)
CRP SERPL-MCNC: 4.07 MG/L
D DIMER PPP FEU-MCNC: <0.27 UG/ML FEU (ref 0–0.5)
DEPRECATED HCO3 PLAS-SCNC: 27 MMOL/L (ref 22–29)
EGFRCR SERPLBLD CKD-EPI 2021: 83 ML/MIN/1.73M2
ERYTHROCYTE [SEDIMENTATION RATE] IN BLOOD BY WESTERGREN METHOD: 4 MM/HR (ref 0–15)
GLUCOSE SERPL-MCNC: 114 MG/DL (ref 70–99)
POTASSIUM SERPL-SCNC: 4.1 MMOL/L (ref 3.4–5.3)
SODIUM SERPL-SCNC: 140 MMOL/L (ref 135–145)
URATE SERPL-MCNC: 8.3 MG/DL (ref 3.4–7)

## 2024-04-15 PROCEDURE — 86225 DNA ANTIBODY NATIVE: CPT | Performed by: ORTHOPAEDIC SURGERY

## 2024-04-15 PROCEDURE — 86038 ANTINUCLEAR ANTIBODIES: CPT | Performed by: ORTHOPAEDIC SURGERY

## 2024-04-15 PROCEDURE — 99203 OFFICE O/P NEW LOW 30 MIN: CPT | Performed by: ORTHOPAEDIC SURGERY

## 2024-04-15 PROCEDURE — 85652 RBC SED RATE AUTOMATED: CPT | Performed by: ORTHOPAEDIC SURGERY

## 2024-04-15 PROCEDURE — 36415 COLL VENOUS BLD VENIPUNCTURE: CPT | Performed by: ORTHOPAEDIC SURGERY

## 2024-04-15 PROCEDURE — 86140 C-REACTIVE PROTEIN: CPT | Performed by: ORTHOPAEDIC SURGERY

## 2024-04-15 PROCEDURE — 85379 FIBRIN DEGRADATION QUANT: CPT | Performed by: ORTHOPAEDIC SURGERY

## 2024-04-15 PROCEDURE — 84550 ASSAY OF BLOOD/URIC ACID: CPT | Performed by: ORTHOPAEDIC SURGERY

## 2024-04-15 PROCEDURE — 86431 RHEUMATOID FACTOR QUANT: CPT | Performed by: ORTHOPAEDIC SURGERY

## 2024-04-15 PROCEDURE — 80048 BASIC METABOLIC PNL TOTAL CA: CPT | Performed by: ORTHOPAEDIC SURGERY

## 2024-04-15 PROCEDURE — 86200 CCP ANTIBODY: CPT | Performed by: ORTHOPAEDIC SURGERY

## 2024-04-15 ASSESSMENT — PAIN SCALES - GENERAL: PAINLEVEL: EXTREME PAIN (9)

## 2024-04-15 NOTE — LETTER
4/15/2024         RE: Clarence Stahl  [address]  [place] [address]        Dear Colleague,    Thank you for referring your patient, Clarence Stahl, to the [facility]. Please see a copy of my visit note below.    S:Bilateral shoulder symptoms R greater than L.  Hx at early age of L TKA.  Bilateral CTS, bilateral elbow issues.         Patient Active Problem List   Diagnosis     Carpal tunnel syndrome, bilateral     Cubital tunnel syndrome of both upper extremities     Displacement of lumbar intervertebral disc without myelopathy     Lateral epicondylitis     Medial epicondylitis of right elbow     Medial epicondylitis, left     Osteoarthritis of left knee     Type II diabetes mellitus (H)     Primary localized osteoarthrosis of right shoulder region     Injury of left shoulder, subsequent encounter          History reviewed. No pertinent past medical history.         Past Surgical History:   Procedure Laterality Date     ORTHOPEDIC SURGERY              Social History     Tobacco Use     Smoking status: Never     Smokeless tobacco: Never   Substance Use Topics     Alcohol use: Not Currently          History reviewed. No pertinent family history.            Allergies   Allergen Reactions     No Known Allergies             Current Outpatient Medications   Medication Sig Dispense Refill     IBUPROFEN PO        acetaminophen (TYLENOL) 500 MG tablet Take 500-1,000 mg by mouth every 6 hours as needed for mild pain (Patient not taking: Reported on 12/11/2023)       Ascorbic Acid (VITAMIN C PO) Take 1 tablet by mouth daily (Patient not taking: Reported on 12/11/2023)       MOUNJARO 10 MG/0.5ML pen INJECT 10 MG SUBCUTANEOUSLY ONCE WEEKLY FOR 4 WEEKS (Patient not taking: Reported on 6/9/2023)            Review Of Systems  Skin: negative  Eyes: negative  Ears/Nose/Throat: negative  Respiratory: No shortness of breath, dyspnea on exertion, cough, or hemoptysis    O: Physical Exam:Pain  to palpation each AC joint R greater than L.  Pain to palpation R glenohumeral articulation.  CMS intact to both upper extremities. Painful arc RUE.  Some difficulty with forward flexion and abduction each upper extremity.       Lab:need to update inflammatory markers and arthritic profile  HgbA1C 6.6    Images:  Narrative & Impression   XR SHOULDER LEFT G/E 3 VIEWS 12/11/2023 11:23 AM      HISTORY: Acute pain of left shoulder     COMPARISON: None.                                                                       IMPRESSION: The left glenohumeral and acromion clavicular joints are  negative for fracture or dislocation.   EXAM: MR left shoulder without  contrast 1/3/2024 8:53 PM     TECHNIQUE: Multiplanar, multisequence imaging of the left shoulder  were obtained without administration of intravenous or intra-articular  gadolinium contrast using routine protocol.     History: eval left rotator cuff; Injury of left shoulder, initial  encounter      Comparison: Left shoulder x-ray 1223     Findings:     ROTATOR CUFF and ASSOCIATED STRUCTURES  Rotator cuff: Supraspinatus tendinosis with low-grade intrasubstance  tear of the middle to posterior tendon at the footprint (series 6,  image 11). Infraspinatus tendinosis without focal tear. Severe  tendinosis of the subscapularis tendon without focal tear. The teres  minor tendon is intact.     Bursa: Mild subacromial bursal fluid.     Musculature: Muscle bulk of rotator cuff is preserved.  Deltoid muscle  bulk is also preserved.  No muscle edema.     Acromioclavicular joint  There are moderate to severe degenerative changes of the  acromioclavicular joint. Acromion is type 2 in sagittal morphology.   Coracoacromial ligament is not thickened.     OSSEOUS STRUCTURES  No fracture, marrow contusion or marrow infiltration. Diffuse red  marrow throughout the humeral diaphysis and scapula.     LONG BICIPITAL TENDON  Severe tendinosis of the extra-articular long head biceps  tendon with  complete tear at the level of the upper bicipital groove. Marked  tendinosis of the bicipital labral and pulley segments of the biceps  tendon.     GLENOHUMERAL JOINT  Joint fluid: Physiologic amount of joint fluid is present.      Cartilage and subarticular bone:  Full-thickness cartilage fissure  along the posterior superior glenoid surface (series 4, image 12). No  Hill-Sachs, reverse Hill-Sachs, or bony Bankart lesions are seen.     Labrum: Limited assessment on this study with relative lack of joint  distention demonstrates complex tearing of the anterior to posterior  superior labrum.     ANCILLARY FINDINGS:  Significant edema throughout the rotator interval.                                                                      Impression:     1. Severe tendinosis of the extra-articular long head biceps tendon  with complete tear at the level of the upper bicipital groove.      2. Marked rotator cuff tendinosis with low-grade intrasubstance tear  of the middle/posterior supraspinatus tendon at the footprint. No  full-thickness tear or tendon retraction.     3. Moderate to severe degenerative changes of the acromioclavicular  joint. Mild subacromial bursitis.     4. Complex tearing of the anterior to posterior superior labrum.     5. Relatively prominent for age red marrow reconversion, nonspecific  but can be s         Narrative & Impression   Exam: MRI of the right shoulder dated 4/7/2024.     COMPARISON: Radiographs dated 6/9/2023.     CLINICAL HISTORY: Osteoarthrosis.     TECHNIQUE: Multiplanar, multisequence MR imaging of the right shoulder  was obtained using standard sequences in 3 orthogonal planes without  the use of intravenous or intra-articular gadolinium contrast.     FINDINGS:     Artifact, mildly limiting evaluation.     Small amount of fluid at the right shoulder glenohumeral joint. Small  amount of fluid in the subscapularis recess and subacromial subdeltoid  bursa.     No marrow  signal abnormalities to suggest fracture or marrow  infiltration.     Moderate osteoarthrosis at the acromioclavicular joint. No os  acromiale. The coracohumeral, coracoclavicular, and coracoacromial  ligaments are intact.     Marked tendinosis of the supraspinatus, infraspinatus, and  subscapularis tendons without full-thickness tear or tendon  retraction. Low to moderate grade intrasubstance partial thickness  tearing of the posterior supraspinatus tendon, with low-grade  articular sided partial thickness tearing of the anterior  infraspinatus tendon. The teres minor tendon is intact. Marked  tendinosis of the subscapularis tendon.     The muscle bulk of the rotator cuff musculature is intact without  significant atrophy or soft tissue edema.     There is tendinosis of the biceps tendon within the bicipital groove.  Tendinosis with partial thickness tear of the intra-articular portion  of the biceps tendon.     Humeral head is well aligned with the glenoid. No Hill-Sachs lesion.  Severe osteoarthrosis involving the glenohumeral joint with biconcave  glenoid with marked full-thickness cartilage loss, greatest along the  posterior glenoid. There is mild subchondral bone marrow edema and  cystic changes. Spurring along the inferomedial humeral head and  degenerative diffuse labral tearing.                                                                      IMPRESSION:  1. Moderate osteoarthrosis at the right shoulder acromioclavicular  joint.     2. Severe osteoarthrosis at the right shoulder glenohumeral joint with  biconcave glenoid with areas of full-thickness cartilage loss,  subchondral bone marrow edema and cystic changes, osteophytic spurring  along the inferomedial humeral head, and marked degenerative labral  tearing.     3. Severe tendinosis of the right shoulder rotator cuff tendons  without full-thickness tear or tendon retraction. There is low to  moderate grade intrasubstance partial thickness tearing  of the  posterior supraspinatus tendon with low-grade articular sided partial  thickness tearing of the infraspinatus tendon.     4. Normal muscle bulk of the right shoulder rotator cuff musculature.     5. Tendinosis with partial-thickness tearing of the biceps tendon.       A:  possible inflammatory arthritis in patient with diabetes, shoulder  ac arthropathy and supraspinatus tendonosis as well as R shoulder glenohumeral arthropathy      P:  See Endocrine diabetes clinic for evaluation and tx of newly diagnosed diabetes  Obtain inflammatory markers and arthritic profile to evaluate for inflammatory arthropathy  See back after studies             In addition to the above assessment and plan each active problem on Clarence's problem list was evaluated today. This included the questioning of Clarence for any medication problems. We will continue the current treatment plan for these active problems except as noted.        Again, thank you for allowing me to participate in the care of your patient.        Sincerely,        Emigdio Hopkins MD

## 2024-04-15 NOTE — PATIENT INSTRUCTIONS
Thank you for choosing [facility]!      FOLLOW-UP  Dr. Hopkins would like you to follow-up after labs. Please stop by the  on your way out or call [phone] to schedule.       LAB WORK   Dr. Hopkins would like you to have some lab work completed. You can go to the Specialty Lab on the upper level to have this completed following your appointment, or you can call [phone] to schedule a future appointment if you do not have time to complete this today.

## 2024-04-16 LAB
ANA SER QL IF: NEGATIVE
RHEUMATOID FACT SERPL-ACNC: <10 IU/ML

## 2024-04-17 LAB
CCP AB SER IA-ACNC: 1.3 U/ML
DSDNA AB SER-ACNC: <0.6 IU/ML

## 2024-04-18 NOTE — PROGRESS NOTES
S:Bilateral shoulder symptoms R greater than L.  Hx at early age of L TKA.  Bilateral CTS, bilateral elbow issues.         Patient Active Problem List   Diagnosis    Carpal tunnel syndrome, bilateral    Cubital tunnel syndrome of both upper extremities    Displacement of lumbar intervertebral disc without myelopathy    Lateral epicondylitis    Medial epicondylitis of right elbow    Medial epicondylitis, left    Osteoarthritis of left knee    Type II diabetes mellitus (H)    Primary localized osteoarthrosis of right shoulder region    Injury of left shoulder, subsequent encounter          History reviewed. No pertinent past medical history.         Past Surgical History:   Procedure Laterality Date    ORTHOPEDIC SURGERY              Social History     Tobacco Use    Smoking status: Never    Smokeless tobacco: Never   Substance Use Topics    Alcohol use: Not Currently          History reviewed. No pertinent family history.            Allergies   Allergen Reactions    No Known Allergies             Current Outpatient Medications   Medication Sig Dispense Refill    IBUPROFEN PO       acetaminophen (TYLENOL) 500 MG tablet Take 500-1,000 mg by mouth every 6 hours as needed for mild pain (Patient not taking: Reported on 12/11/2023)      Ascorbic Acid (VITAMIN C PO) Take 1 tablet by mouth daily (Patient not taking: Reported on 12/11/2023)      MOUNJARO 10 MG/0.5ML pen INJECT 10 MG SUBCUTANEOUSLY ONCE WEEKLY FOR 4 WEEKS (Patient not taking: Reported on 6/9/2023)            Review Of Systems  Skin: negative  Eyes: negative  Ears/Nose/Throat: negative  Respiratory: No shortness of breath, dyspnea on exertion, cough, or hemoptysis    O: Physical Exam:Pain to palpation each AC joint R greater than L.  Pain to palpation R glenohumeral articulation.  CMS intact to both upper extremities. Painful arc RUE.  Some difficulty with forward flexion and abduction each upper extremity.       Lab:need to update inflammatory markers and  arthritic profile  HgbA1C 6.6    Images:  Narrative & Impression   XR SHOULDER LEFT G/E 3 VIEWS 12/11/2023 11:23 AM      HISTORY: Acute pain of left shoulder     COMPARISON: None.                                                                       IMPRESSION: The left glenohumeral and acromion clavicular joints are  negative for fracture or dislocation.   EXAM: MR left shoulder without  contrast 1/3/2024 8:53 PM     TECHNIQUE: Multiplanar, multisequence imaging of the left shoulder  were obtained without administration of intravenous or intra-articular  gadolinium contrast using routine protocol.     History: eval left rotator cuff; Injury of left shoulder, initial  encounter      Comparison: Left shoulder x-ray 1223     Findings:     ROTATOR CUFF and ASSOCIATED STRUCTURES  Rotator cuff: Supraspinatus tendinosis with low-grade intrasubstance  tear of the middle to posterior tendon at the footprint (series 6,  image 11). Infraspinatus tendinosis without focal tear. Severe  tendinosis of the subscapularis tendon without focal tear. The teres  minor tendon is intact.     Bursa: Mild subacromial bursal fluid.     Musculature: Muscle bulk of rotator cuff is preserved.  Deltoid muscle  bulk is also preserved.  No muscle edema.     Acromioclavicular joint  There are moderate to severe degenerative changes of the  acromioclavicular joint. Acromion is type 2 in sagittal morphology.   Coracoacromial ligament is not thickened.     OSSEOUS STRUCTURES  No fracture, marrow contusion or marrow infiltration. Diffuse red  marrow throughout the humeral diaphysis and scapula.     LONG BICIPITAL TENDON  Severe tendinosis of the extra-articular long head biceps tendon with  complete tear at the level of the upper bicipital groove. Marked  tendinosis of the bicipital labral and pulley segments of the biceps  tendon.     GLENOHUMERAL JOINT  Joint fluid: Physiologic amount of joint fluid is present.      Cartilage and subarticular bone:   Full-thickness cartilage fissure  along the posterior superior glenoid surface (series 4, image 12). No  Hill-Sachs, reverse Hill-Sachs, or bony Bankart lesions are seen.     Labrum: Limited assessment on this study with relative lack of joint  distention demonstrates complex tearing of the anterior to posterior  superior labrum.     ANCILLARY FINDINGS:  Significant edema throughout the rotator interval.                                                                      Impression:     1. Severe tendinosis of the extra-articular long head biceps tendon  with complete tear at the level of the upper bicipital groove.      2. Marked rotator cuff tendinosis with low-grade intrasubstance tear  of the middle/posterior supraspinatus tendon at the footprint. No  full-thickness tear or tendon retraction.     3. Moderate to severe degenerative changes of the acromioclavicular  joint. Mild subacromial bursitis.     4. Complex tearing of the anterior to posterior superior labrum.     5. Relatively prominent for age red marrow reconversion, nonspecific  but can be s         Narrative & Impression   Exam: MRI of the right shoulder dated 4/7/2024.     COMPARISON: Radiographs dated 6/9/2023.     CLINICAL HISTORY: Osteoarthrosis.     TECHNIQUE: Multiplanar, multisequence MR imaging of the right shoulder  was obtained using standard sequences in 3 orthogonal planes without  the use of intravenous or intra-articular gadolinium contrast.     FINDINGS:     Artifact, mildly limiting evaluation.     Small amount of fluid at the right shoulder glenohumeral joint. Small  amount of fluid in the subscapularis recess and subacromial subdeltoid  bursa.     No marrow signal abnormalities to suggest fracture or marrow  infiltration.     Moderate osteoarthrosis at the acromioclavicular joint. No os  acromiale. The coracohumeral, coracoclavicular, and coracoacromial  ligaments are intact.     Marked tendinosis of the supraspinatus, infraspinatus,  and  subscapularis tendons without full-thickness tear or tendon  retraction. Low to moderate grade intrasubstance partial thickness  tearing of the posterior supraspinatus tendon, with low-grade  articular sided partial thickness tearing of the anterior  infraspinatus tendon. The teres minor tendon is intact. Marked  tendinosis of the subscapularis tendon.     The muscle bulk of the rotator cuff musculature is intact without  significant atrophy or soft tissue edema.     There is tendinosis of the biceps tendon within the bicipital groove.  Tendinosis with partial thickness tear of the intra-articular portion  of the biceps tendon.     Humeral head is well aligned with the glenoid. No Hill-Sachs lesion.  Severe osteoarthrosis involving the glenohumeral joint with biconcave  glenoid with marked full-thickness cartilage loss, greatest along the  posterior glenoid. There is mild subchondral bone marrow edema and  cystic changes. Spurring along the inferomedial humeral head and  degenerative diffuse labral tearing.                                                                      IMPRESSION:  1. Moderate osteoarthrosis at the right shoulder acromioclavicular  joint.     2. Severe osteoarthrosis at the right shoulder glenohumeral joint with  biconcave glenoid with areas of full-thickness cartilage loss,  subchondral bone marrow edema and cystic changes, osteophytic spurring  along the inferomedial humeral head, and marked degenerative labral  tearing.     3. Severe tendinosis of the right shoulder rotator cuff tendons  without full-thickness tear or tendon retraction. There is low to  moderate grade intrasubstance partial thickness tearing of the  posterior supraspinatus tendon with low-grade articular sided partial  thickness tearing of the infraspinatus tendon.     4. Normal muscle bulk of the right shoulder rotator cuff musculature.     5. Tendinosis with partial-thickness tearing of the biceps tendon.       A:   possible inflammatory arthritis in patient with diabetes, shoulder  ac arthropathy and supraspinatus tendonosis as well as R shoulder glenohumeral arthropathy      P:  See Endocrine diabetes clinic for evaluation and tx of newly diagnosed diabetes  Obtain inflammatory markers and arthritic profile to evaluate for inflammatory arthropathy  See back after studies             In addition to the above assessment and plan each active problem on Clarence's problem list was evaluated today. This included the questioning of Clarence for any medication problems. We will continue the current treatment plan for these active problems except as noted.

## 2024-04-19 ENCOUNTER — OFFICE VISIT (OUTPATIENT)
Dept: ORTHOPEDICS | Facility: CLINIC | Age: 49
End: 2024-04-19
Payer: COMMERCIAL

## 2024-04-19 VITALS
DIASTOLIC BLOOD PRESSURE: 84 MMHG | BODY MASS INDEX: 45.1 KG/M2 | WEIGHT: 315 LBS | HEIGHT: 70 IN | SYSTOLIC BLOOD PRESSURE: 138 MMHG

## 2024-04-19 DIAGNOSIS — M19.011 PRIMARY LOCALIZED OSTEOARTHROSIS OF RIGHT SHOULDER REGION: Primary | ICD-10-CM

## 2024-04-19 PROCEDURE — 99213 OFFICE O/P EST LOW 20 MIN: CPT | Mod: 25 | Performed by: ORTHOPAEDIC SURGERY

## 2024-04-19 PROCEDURE — 20605 DRAIN/INJ JOINT/BURSA W/O US: CPT | Mod: 51 | Performed by: ORTHOPAEDIC SURGERY

## 2024-04-19 PROCEDURE — 20610 DRAIN/INJ JOINT/BURSA W/O US: CPT | Mod: RT | Performed by: ORTHOPAEDIC SURGERY

## 2024-04-19 RX ORDER — LIDOCAINE HYDROCHLORIDE 10 MG/ML
2 INJECTION, SOLUTION INFILTRATION; PERINEURAL
Status: SHIPPED | OUTPATIENT
Start: 2024-04-19

## 2024-04-19 RX ORDER — TRIAMCINOLONE ACETONIDE 40 MG/ML
40 INJECTION, SUSPENSION INTRA-ARTICULAR; INTRAMUSCULAR
Status: SHIPPED | OUTPATIENT
Start: 2024-04-19

## 2024-04-19 RX ORDER — ALLOPURINOL 300 MG/1
300 TABLET ORAL DAILY
Qty: 90 TABLET | Refills: 3 | Status: SHIPPED | OUTPATIENT
Start: 2024-05-10

## 2024-04-19 RX ORDER — ALLOPURINOL 100 MG/1
100 TABLET ORAL DAILY
Qty: 42 TABLET | Refills: 0 | Status: SHIPPED | OUTPATIENT
Start: 2024-04-19

## 2024-04-19 RX ORDER — BUPIVACAINE HYDROCHLORIDE 5 MG/ML
2 INJECTION, SOLUTION PERINEURAL
Status: SHIPPED | OUTPATIENT
Start: 2024-04-19

## 2024-04-19 RX ADMIN — BUPIVACAINE HYDROCHLORIDE 2 ML: 5 INJECTION, SOLUTION PERINEURAL at 11:44

## 2024-04-19 RX ADMIN — LIDOCAINE HYDROCHLORIDE 2 ML: 10 INJECTION, SOLUTION INFILTRATION; PERINEURAL at 11:44

## 2024-04-19 RX ADMIN — TRIAMCINOLONE ACETONIDE 40 MG: 40 INJECTION, SUSPENSION INTRA-ARTICULAR; INTRAMUSCULAR at 11:48

## 2024-04-19 RX ADMIN — LIDOCAINE HYDROCHLORIDE 2 ML: 10 INJECTION, SOLUTION INFILTRATION; PERINEURAL at 11:48

## 2024-04-19 RX ADMIN — TRIAMCINOLONE ACETONIDE 40 MG: 40 INJECTION, SUSPENSION INTRA-ARTICULAR; INTRAMUSCULAR at 11:44

## 2024-04-19 RX ADMIN — BUPIVACAINE HYDROCHLORIDE 2 ML: 5 INJECTION, SOLUTION PERINEURAL at 11:48

## 2024-04-19 ASSESSMENT — PAIN SCALES - GENERAL: PAINLEVEL: EXTREME PAIN (9)

## 2024-04-19 NOTE — PATIENT INSTRUCTIONS
Thank you for choosing [facility]!      FOLLOW-UP  Dr. Hopkins would like you to follow-up in 1 month. Please stop by the  on your way out or call [phone] to schedule.     CARE AFTER YOUR INJECTION  Today you had a steroid injection of your right shoulder x2: Joint and Bursa. You also had a steroid injection of your left elbow.  *Please do not soak in a hot tub, bath, or swimming pool for the next 48 hours  *It is ok to shower  *Ice today and only do your normal amounts of activity  *The lidocaine (what is giving you pain relief right now) will likely stop working in 1-2 hours.  You will then have pain again, similar to before you received the injection. The corticosteroid will not start working until approximately 1-2 weeks from now.  *In a small percentage of people, cortisone can cause flushing/redness in the face. This usually lasts for 1-3 days and resolves. Cool compress, Ibuprofen/Tylenol can help if this happens.

## 2024-04-19 NOTE — PROGRESS NOTES
S: R shoulder symptoms greater than L.  Here to further evaluate and to review labs obtained earlier in the week.  Also more symptomatic L elbow.         Patient Active Problem List   Diagnosis    Carpal tunnel syndrome, bilateral    Cubital tunnel syndrome of both upper extremities    Displacement of lumbar intervertebral disc without myelopathy    Lateral epicondylitis    Medial epicondylitis of right elbow    Medial epicondylitis, left    Osteoarthritis of left knee    Type II diabetes mellitus (H)    Primary localized osteoarthrosis of right shoulder region    Injury of left shoulder, subsequent encounter          No past medical history on file.         Past Surgical History:   Procedure Laterality Date    ORTHOPEDIC SURGERY              Social History     Tobacco Use    Smoking status: Never    Smokeless tobacco: Never   Substance Use Topics    Alcohol use: Not Currently          No family history on file.            Allergies   Allergen Reactions    No Known Allergies             Current Outpatient Medications   Medication Sig Dispense Refill    acetaminophen (TYLENOL) 500 MG tablet Take 500-1,000 mg by mouth every 6 hours as needed for mild pain (Patient not taking: Reported on 12/11/2023)      Ascorbic Acid (VITAMIN C PO) Take 1 tablet by mouth daily (Patient not taking: Reported on 12/11/2023)      IBUPROFEN PO       MOUNJARO 10 MG/0.5ML pen INJECT 10 MG SUBCUTANEOUSLY ONCE WEEKLY FOR 4 WEEKS (Patient not taking: Reported on 6/9/2023)            Review Of Systems  Skin: negative  Eyes: negative  Ears/Nose/Throat: negative  Respiratory: No shortness of breath, dyspnea on exertion, cough, or hemoptysis    O: Physical Exam:  Pain to palpation L elbow over ECRB origin.  Adequate elbow flexion and extension.  CMS intact to both upper extremities.  R shoulder pain to palpation about the glenohumeral articulation.  Pain over the AC joint.  Positive forced forward flexion impingement sign.      Lab:Uric Acid 8.3,  Hgb A1C 6.6    Images:Impression      Severe medial compartment and moderate patellofemoral osteoarthritis. S/p  ACL reconstruction.    All services were personally performed by Shaneka Ortiz MD.  Documentation  performed by Joseph Grajeda MS, ATC based on my observation of services  performed and provider statements to me.    Shaneka Ortiz MD  2/4/2015  Narrative    This radiology exam was performed at the [place] location in the  [facility] Rad[facility] Department and interpreted by  Shaneka Ortiz MD.    HISTORY:    A 39 y.o. year - old male with left knee pain with history of trauma.  History of prior ACL reconstruction at outside institution.    TECHNICAL:    A standing bilateral AP, along with WB lateral and NWB sunrise were  obtained.    FINDINGS:    S/p ACL reconstruction. The femoral metal interference screw is at the one  o'clock position. There is one metal interference screw in the tibial  tunnel with widening and large cyst at the intercondylar spine.  Moderate  patellofemoral osteoarthritic.  Severe medial joint space narrowing.       Indication:   Low back pain sciatica pain to both buttocks.     Technique:   Sagittal T1-T2 and STIR imaging.  Axial T1 and T2 imaging.     Findings:   Mild lumbar curve.  No acute compression.  No high-grade spinal canal narrowing.  Slight retrolisthesis L5 on S1. Hemangioma at L1 and L4 vertebral bodies.     L1-2:  Normal.   L2-L3:  Normal.   L3-L4:  No significant disc bulging.  Mild facet arthropathy.  Spinal canal and foramina are patent.   L4-5:  Mild annular bulging.  Mild to moderate facet arthropathy.  Spinal canal and foramina are patent.   L5-S1:  Slight retrolisthesis L5 on S1.  Mild left and no significant right foraminal narrowing.  Spinal canal is patent.   Normal visualized pelvic bony structures.     Impression:    1. Mild lumbar curve.  No acute compression.  No high-grade spinal canal narrowing.    2. Retrolisthesis L5 on S1 with  mild disc bulging and mild left foraminal narrowing.    3. Mild to moderate facet arthropathy L4-L5 and L5-S1.    4. Spinal canal and foramen are patent.     Dictated by Alonso Boles MD @ Apr 26 2015 12:30PM   Exam: MRI of the right shoulder dated 4/7/2024.     COMPARISON: Radiographs dated 6/9/2023.     CLINICAL HISTORY: Osteoarthrosis.     TECHNIQUE: Multiplanar, multisequence MR imaging of the right shoulder  was obtained using standard sequences in 3 orthogonal planes without  the use of intravenous or intra-articular gadolinium contrast.     FINDINGS:     Artifact, mildly limiting evaluation.     Small amount of fluid at the right shoulder glenohumeral joint. Small  amount of fluid in the subscapularis recess and subacromial subdeltoid  bursa.     No marrow signal abnormalities to suggest fracture or marrow  infiltration.     Moderate osteoarthrosis at the acromioclavicular joint. No os  acromiale. The coracohumeral, coracoclavicular, and coracoacromial  ligaments are intact.     Marked tendinosis of the supraspinatus, infraspinatus, and  subscapularis tendons without full-thickness tear or tendon  retraction. Low to moderate grade intrasubstance partial thickness  tearing of the posterior supraspinatus tendon, with low-grade  articular sided partial thickness tearing of the anterior  infraspinatus tendon. The teres minor tendon is intact. Marked  tendinosis of the subscapularis tendon.     The muscle bulk of the rotator cuff musculature is intact without  significant atrophy or soft tissue edema.     There is tendinosis of the biceps tendon within the bicipital groove.  Tendinosis with partial thickness tear of the intra-articular portion  of the biceps tendon.     Humeral head is well aligned with the glenoid. No Hill-Sachs lesion.  Severe osteoarthrosis involving the glenohumeral joint with biconcave  glenoid with marked full-thickness cartilage loss, greatest along the  posterior glenoid. There is mild  subchondral bone marrow edema and  cystic changes. Spurring along the inferomedial humeral head and  degenerative diffuse labral tearing.                                                                      IMPRESSION:  1. Moderate osteoarthrosis at the right shoulder acromioclavicular  joint.     2. Severe osteoarthrosis at the right shoulder glenohumeral joint with  biconcave glenoid with areas of full-thickness cartilage loss,  subchondral bone marrow edema and cystic changes, osteophytic spurring  along the inferomedial humeral head, and marked degenerative labral  tearing.     3. Severe tendinosis of the right shoulder rotator cuff tendons  without full-thickness tear or tendon retraction. There is low to  moderate grade intrasubstance partial thickness tearing of the  posterior supraspinatus tendon with low-grade articular sided partial  thickness tearing of the infraspinatus tendon.     4. Normal muscle bulk of the right shoulder rotator cuff musculature.     5. Tendinosis with partial-thickness tearing of the biceps tendon.    A:  r shoulder glenohumeral arthrosis, R shoulder bursitis in a patient with untreated diabetes and inflammatory arthropathy/ gout.  L tennis elbow.      P:    Inject L ECRB origin/ R shoulder glenohumeral articulation and subacromial bursitis after verbal and written consent/ ethyl chloride/ chloroprep.  Follow outcomes:  elbow 100% better, shoulder perhaps 20% better as still feels catching /grinding  Notify if exacerbation symptoms  See Endocrine Diabetes clinic  Start Allopurinol and ramp up from 100 mg daily to 300 mg.  Re check uric acid in 3 mos.  See back in one month.           In addition to the above assessment and plan each active problem on Clarence's problem list was evaluated today. This included the questioning of Clarence for any medication problems. We will continue the current treatment plan for these active problems except as noted.

## 2024-04-19 NOTE — PROGRESS NOTES
Large Joint Injection/Arthocentesis: R glenohumeral joint/R subacromial bursa    Date/Time: 4/19/2024 11:44 AM    Performed by: Emigdio Hopkins MD  Authorized by: Emigdio Hopkins MD    Indications:  Pain  Needle Size:  25 G  Guidance: landmark guided    Location:  Shoulder   Location comment:  R subacromial bursa      Site:  R glenohumeral joint  Medications:  2 mL lidocaine 1 %; 2 mL BUPivacaine 0.5 %; 40 mg triamcinolone 40 MG/ML  Outcome:  Tolerated well, no immediate complications  Procedure discussed: discussed risks, benefits, and alternatives    Consent Given by:  Patient  Timeout: timeout called immediately prior to procedure    Prep: patient was prepped and draped in usual sterile fashion

## 2024-04-19 NOTE — LETTER
4/19/2024         RE: Clarence Stahl  [address]  [place] [address]        Dear Colleague,    Thank you for referring your patient, Clarence Stahl, to the [facility]. Please see a copy of my visit note below.    S: R shoulder symptoms greater than L.  Here to further evaluate and to review labs obtained earlier in the week.  Also more symptomatic L elbow.         Patient Active Problem List   Diagnosis     Carpal tunnel syndrome, bilateral     Cubital tunnel syndrome of both upper extremities     Displacement of lumbar intervertebral disc without myelopathy     Lateral epicondylitis     Medial epicondylitis of right elbow     Medial epicondylitis, left     Osteoarthritis of left knee     Type II diabetes mellitus (H)     Primary localized osteoarthrosis of right shoulder region     Injury of left shoulder, subsequent encounter          No past medical history on file.         Past Surgical History:   Procedure Laterality Date     ORTHOPEDIC SURGERY              Social History     Tobacco Use     Smoking status: Never     Smokeless tobacco: Never   Substance Use Topics     Alcohol use: Not Currently          No family history on file.            Allergies   Allergen Reactions     No Known Allergies             Current Outpatient Medications   Medication Sig Dispense Refill     acetaminophen (TYLENOL) 500 MG tablet Take 500-1,000 mg by mouth every 6 hours as needed for mild pain (Patient not taking: Reported on 12/11/2023)       Ascorbic Acid (VITAMIN C PO) Take 1 tablet by mouth daily (Patient not taking: Reported on 12/11/2023)       IBUPROFEN PO        MOUNJARO 10 MG/0.5ML pen INJECT 10 MG SUBCUTANEOUSLY ONCE WEEKLY FOR 4 WEEKS (Patient not taking: Reported on 6/9/2023)            Review Of Systems  Skin: negative  Eyes: negative  Ears/Nose/Throat: negative  Respiratory: No shortness of breath, dyspnea on exertion, cough, or hemoptysis    O: Physical Exam:  Pain to  palpation L elbow over ECRB origin.  Adequate elbow flexion and extension.  CMS intact to both upper extremities.  R shoulder pain to palpation about the glenohumeral articulation.  Pain over the AC joint.  Positive forced forward flexion impingement sign.      Lab:Uric Acid 8.3, Hgb A1C 6.6    Images:Impression      Severe medial compartment and moderate patellofemoral osteoarthritis. S/p  ACL reconstruction.    All services were personally performed by Sahneka Ortiz MD.  Documentation  performed by Joseph Grajeda MS, ATC based on my observation of services  performed and provider statements to me.    Shaneka Ortiz MD  2/4/2015  Narrative    This radiology exam was performed at the [place] location in the  [facility]iology Department and interpreted by  Shaneka Ortiz MD.    HISTORY:    A 39 y.o. year - old male with left knee pain with history of trauma.  History of prior ACL reconstruction at outside institution.    TECHNICAL:    A standing bilateral AP, along with WB lateral and NWB sunrise were  obtained.    FINDINGS:    S/p ACL reconstruction. The femoral metal interference screw is at the one  o'clock position. There is one metal interference screw in the tibial  tunnel with widening and large cyst at the intercondylar spine.  Moderate  patellofemoral osteoarthritic.  Severe medial joint space narrowing.       Indication:   Low back pain sciatica pain to both buttocks.     Technique:   Sagittal T1-T2 and STIR imaging.  Axial T1 and T2 imaging.     Findings:   Mild lumbar curve.  No acute compression.  No high-grade spinal canal narrowing.  Slight retrolisthesis L5 on S1. Hemangioma at L1 and L4 vertebral bodies.     L1-2:  Normal.   L2-L3:  Normal.   L3-L4:  No significant disc bulging.  Mild facet arthropathy.  Spinal canal and foramina are patent.   L4-5:  Mild annular bulging.  Mild to moderate facet arthropathy.  Spinal canal and foramina are patent.   L5-S1:  Slight  retrolisthesis L5 on S1.  Mild left and no significant right foraminal narrowing.  Spinal canal is patent.   Normal visualized pelvic bony structures.     Impression:    1. Mild lumbar curve.  No acute compression.  No high-grade spinal canal narrowing.    2. Retrolisthesis L5 on S1 with mild disc bulging and mild left foraminal narrowing.    3. Mild to moderate facet arthropathy L4-L5 and L5-S1.    4. Spinal canal and foramen are patent.     Dictated by Alonso Boles MD @ Apr 26 2015 12:30PM   Exam: MRI of the right shoulder dated 4/7/2024.     COMPARISON: Radiographs dated 6/9/2023.     CLINICAL HISTORY: Osteoarthrosis.     TECHNIQUE: Multiplanar, multisequence MR imaging of the right shoulder  was obtained using standard sequences in 3 orthogonal planes without  the use of intravenous or intra-articular gadolinium contrast.     FINDINGS:     Artifact, mildly limiting evaluation.     Small amount of fluid at the right shoulder glenohumeral joint. Small  amount of fluid in the subscapularis recess and subacromial subdeltoid  bursa.     No marrow signal abnormalities to suggest fracture or marrow  infiltration.     Moderate osteoarthrosis at the acromioclavicular joint. No os  acromiale. The coracohumeral, coracoclavicular, and coracoacromial  ligaments are intact.     Marked tendinosis of the supraspinatus, infraspinatus, and  subscapularis tendons without full-thickness tear or tendon  retraction. Low to moderate grade intrasubstance partial thickness  tearing of the posterior supraspinatus tendon, with low-grade  articular sided partial thickness tearing of the anterior  infraspinatus tendon. The teres minor tendon is intact. Marked  tendinosis of the subscapularis tendon.     The muscle bulk of the rotator cuff musculature is intact without  significant atrophy or soft tissue edema.     There is tendinosis of the biceps tendon within the bicipital groove.  Tendinosis with partial thickness tear of the  intra-articular portion  of the biceps tendon.     Humeral head is well aligned with the glenoid. No Hill-Sachs lesion.  Severe osteoarthrosis involving the glenohumeral joint with biconcave  glenoid with marked full-thickness cartilage loss, greatest along the  posterior glenoid. There is mild subchondral bone marrow edema and  cystic changes. Spurring along the inferomedial humeral head and  degenerative diffuse labral tearing.                                                                      IMPRESSION:  1. Moderate osteoarthrosis at the right shoulder acromioclavicular  joint.     2. Severe osteoarthrosis at the right shoulder glenohumeral joint with  biconcave glenoid with areas of full-thickness cartilage loss,  subchondral bone marrow edema and cystic changes, osteophytic spurring  along the inferomedial humeral head, and marked degenerative labral  tearing.     3. Severe tendinosis of the right shoulder rotator cuff tendons  without full-thickness tear or tendon retraction. There is low to  moderate grade intrasubstance partial thickness tearing of the  posterior supraspinatus tendon with low-grade articular sided partial  thickness tearing of the infraspinatus tendon.     4. Normal muscle bulk of the right shoulder rotator cuff musculature.     5. Tendinosis with partial-thickness tearing of the biceps tendon.    A:  r shoulder glenohumeral arthrosis, R shoulder bursitis in a patient with untreated diabetes and inflammatory arthropathy/ gout.  L tennis elbow.      P:    Inject L ECRB origin/ R shoulder glenohumeral articulation and subacromial bursitis after verbal and written consent/ ethyl chloride/ chloroprep.  Follow outcomes:  elbow 100% better, shoulder perhaps 20% better as still feels catching /grinding  Notify if exacerbation symptoms  See Endocrine Diabetes clinic  Start Allopurinol and ramp up from 100 mg daily to 300 mg.  Re check uric acid in 3 mos.  See back in one month.           In  addition to the above assessment and plan each active problem on Clarence's problem list was evaluated today. This included the questioning of Clarence for any medication problems. We will continue the current treatment plan for these active problems except as noted.        Again, thank you for allowing me to participate in the care of your patient.        Sincerely,        Emigdio Hopkins MD

## 2024-04-19 NOTE — PROGRESS NOTES
Medium Joint Injection/Arthrocentesis: L elbow    Date/Time: 4/19/2024 11:48 AM    Performed by: Emigdio Hopkins MD  Authorized by: Emigdio Hopkins MD    Indications:  Pain  Needle Size:  25 G  Guidance: surface landmarks    Location:  Elbow  Site:  L elbow  Medications:  2 mL lidocaine 1 %; 2 mL BUPivacaine 0.5 %; 40 mg triamcinolone 40 MG/ML  Outcome:  Tolerated well, no immediate complications  Procedure discussed: discussed risks, benefits, and alternatives    Consent Given by:  Patient  Timeout: timeout called immediately prior to procedure    Prep: patient was prepped and draped in usual sterile fashion

## 2024-04-24 ENCOUNTER — HOSPITAL ENCOUNTER (EMERGENCY)
Facility: CLINIC | Age: 49
Discharge: HOME | End: 2024-04-24
Attending: FAMILY MEDICINE | Admitting: FAMILY MEDICINE
Payer: COMMERCIAL

## 2024-04-24 ENCOUNTER — APPOINTMENT (OUTPATIENT)
Dept: CT IMAGING | Facility: CLINIC | Age: 49
End: 2024-04-24
Attending: FAMILY MEDICINE
Payer: COMMERCIAL

## 2024-04-24 VITALS
OXYGEN SATURATION: 95 % | BODY MASS INDEX: 45.1 KG/M2 | HEIGHT: 70 IN | TEMPERATURE: 97.3 F | HEART RATE: 63 BPM | SYSTOLIC BLOOD PRESSURE: 144 MMHG | RESPIRATION RATE: 18 BRPM | WEIGHT: 315 LBS | DIASTOLIC BLOOD PRESSURE: 96 MMHG

## 2024-04-24 DIAGNOSIS — K85.90 ACUTE PANCREATITIS WITHOUT INFECTION OR NECROSIS, UNSPECIFIED PANCREATITIS TYPE: ICD-10-CM

## 2024-04-24 LAB
ALBUMIN SERPL BCG-MCNC: 4.4 G/DL (ref 3.5–5.2)
ALBUMIN UR-MCNC: NEGATIVE MG/DL
ALP SERPL-CCNC: 104 U/L (ref 40–150)
ALT SERPL W P-5'-P-CCNC: 25 U/L (ref 0–70)
ANION GAP SERPL CALCULATED.3IONS-SCNC: 9 MMOL/L (ref 7–15)
APPEARANCE UR: CLEAR
AST SERPL W P-5'-P-CCNC: 12 U/L (ref 0–45)
BASOPHILS # BLD AUTO: 0.1 10E3/UL (ref 0–0.2)
BASOPHILS NFR BLD AUTO: 0 %
BILIRUB SERPL-MCNC: 0.4 MG/DL
BILIRUB UR QL STRIP: NEGATIVE
BUN SERPL-MCNC: 28.8 MG/DL (ref 6–20)
CALCIUM SERPL-MCNC: 9.1 MG/DL (ref 8.6–10)
CHLORIDE SERPL-SCNC: 101 MMOL/L (ref 98–107)
COLOR UR AUTO: NORMAL
CREAT SERPL-MCNC: 1.09 MG/DL (ref 0.67–1.17)
DEPRECATED HCO3 PLAS-SCNC: 29 MMOL/L (ref 22–29)
EGFRCR SERPLBLD CKD-EPI 2021: 84 ML/MIN/1.73M2
EOSINOPHIL # BLD AUTO: 0.3 10E3/UL (ref 0–0.7)
EOSINOPHIL NFR BLD AUTO: 2 %
ERYTHROCYTE [DISTWIDTH] IN BLOOD BY AUTOMATED COUNT: 12 % (ref 10–15)
GLUCOSE SERPL-MCNC: 189 MG/DL (ref 70–99)
GLUCOSE UR STRIP-MCNC: NEGATIVE MG/DL
HCT VFR BLD AUTO: 47.8 % (ref 40–53)
HGB BLD-MCNC: 16.8 G/DL (ref 13.3–17.7)
HGB UR QL STRIP: NEGATIVE
HOLD SPECIMEN: NORMAL
HOLD SPECIMEN: NORMAL
IMM GRANULOCYTES # BLD: 0.1 10E3/UL
IMM GRANULOCYTES NFR BLD: 1 %
KETONES UR STRIP-MCNC: NEGATIVE MG/DL
LEUKOCYTE ESTERASE UR QL STRIP: NEGATIVE
LIPASE SERPL-CCNC: 275 U/L (ref 13–60)
LYMPHOCYTES # BLD AUTO: 2.9 10E3/UL (ref 0.8–5.3)
LYMPHOCYTES NFR BLD AUTO: 18 %
MCH RBC QN AUTO: 30.5 PG (ref 26.5–33)
MCHC RBC AUTO-ENTMCNC: 35.1 G/DL (ref 31.5–36.5)
MCV RBC AUTO: 87 FL (ref 78–100)
MONOCYTES # BLD AUTO: 1 10E3/UL (ref 0–1.3)
MONOCYTES NFR BLD AUTO: 6 %
NEUTROPHILS # BLD AUTO: 12.2 10E3/UL (ref 1.6–8.3)
NEUTROPHILS NFR BLD AUTO: 74 %
NITRATE UR QL: NEGATIVE
NRBC # BLD AUTO: 0 10E3/UL
NRBC BLD AUTO-RTO: 0 /100
PH UR STRIP: 5 [PH] (ref 5–7)
PLATELET # BLD AUTO: 267 10E3/UL (ref 150–450)
POTASSIUM SERPL-SCNC: 4.4 MMOL/L (ref 3.4–5.3)
PROT SERPL-MCNC: 7.5 G/DL (ref 6.4–8.3)
RBC # BLD AUTO: 5.51 10E6/UL (ref 4.4–5.9)
SODIUM SERPL-SCNC: 139 MMOL/L (ref 135–145)
SP GR UR STRIP: 1.01 (ref 1–1.03)
TROPONIN T SERPL HS-MCNC: <6 NG/L
UROBILINOGEN UR STRIP-MCNC: NORMAL MG/DL
WBC # BLD AUTO: 16.5 10E3/UL (ref 4–11)

## 2024-04-24 PROCEDURE — 99284 EMERGENCY DEPT VISIT MOD MDM: CPT | Mod: 25 | Performed by: FAMILY MEDICINE

## 2024-04-24 PROCEDURE — 83690 ASSAY OF LIPASE: CPT | Performed by: FAMILY MEDICINE

## 2024-04-24 PROCEDURE — 258N000003 HC RX IP 258 OP 636: Performed by: FAMILY MEDICINE

## 2024-04-24 PROCEDURE — 80053 COMPREHEN METABOLIC PANEL: CPT | Performed by: FAMILY MEDICINE

## 2024-04-24 PROCEDURE — 36415 COLL VENOUS BLD VENIPUNCTURE: CPT | Performed by: FAMILY MEDICINE

## 2024-04-24 PROCEDURE — 84484 ASSAY OF TROPONIN QUANT: CPT | Performed by: FAMILY MEDICINE

## 2024-04-24 PROCEDURE — 250N000011 HC RX IP 250 OP 636: Performed by: FAMILY MEDICINE

## 2024-04-24 PROCEDURE — 99285 EMERGENCY DEPT VISIT HI MDM: CPT | Mod: 25 | Performed by: FAMILY MEDICINE

## 2024-04-24 PROCEDURE — 93010 ELECTROCARDIOGRAM REPORT: CPT | Performed by: FAMILY MEDICINE

## 2024-04-24 PROCEDURE — 250N000009 HC RX 250: Performed by: FAMILY MEDICINE

## 2024-04-24 PROCEDURE — 93005 ELECTROCARDIOGRAM TRACING: CPT | Performed by: FAMILY MEDICINE

## 2024-04-24 PROCEDURE — 96360 HYDRATION IV INFUSION INIT: CPT | Mod: 59 | Performed by: FAMILY MEDICINE

## 2024-04-24 PROCEDURE — 81003 URINALYSIS AUTO W/O SCOPE: CPT | Performed by: FAMILY MEDICINE

## 2024-04-24 PROCEDURE — 85025 COMPLETE CBC W/AUTO DIFF WBC: CPT | Performed by: FAMILY MEDICINE

## 2024-04-24 PROCEDURE — 74177 CT ABD & PELVIS W/CONTRAST: CPT

## 2024-04-24 RX ORDER — IOPAMIDOL 755 MG/ML
100 INJECTION, SOLUTION INTRAVASCULAR ONCE
Status: COMPLETED | OUTPATIENT
Start: 2024-04-24 | End: 2024-04-24

## 2024-04-24 RX ADMIN — SODIUM CHLORIDE 1000 ML: 9 INJECTION, SOLUTION INTRAVENOUS at 21:37

## 2024-04-24 RX ADMIN — SODIUM CHLORIDE 74 ML: 9 INJECTION, SOLUTION INTRAVENOUS at 20:33

## 2024-04-24 RX ADMIN — IOPAMIDOL 100 ML: 755 INJECTION, SOLUTION INTRAVENOUS at 20:33

## 2024-04-24 ASSESSMENT — COLUMBIA-SUICIDE SEVERITY RATING SCALE - C-SSRS
1. IN THE PAST MONTH, HAVE YOU WISHED YOU WERE DEAD OR WISHED YOU COULD GO TO SLEEP AND NOT WAKE UP?: NO
6. HAVE YOU EVER DONE ANYTHING, STARTED TO DO ANYTHING, OR PREPARED TO DO ANYTHING TO END YOUR LIFE?: NO
2. HAVE YOU ACTUALLY HAD ANY THOUGHTS OF KILLING YOURSELF IN THE PAST MONTH?: NO

## 2024-04-24 ASSESSMENT — ACTIVITIES OF DAILY LIVING (ADL)
ADLS_ACUITY_SCORE: 35

## 2024-04-25 NOTE — ED PROVIDER NOTES
History     Chief Complaint   Patient presents with    Abdominal Pain     HPI  Clarence Stahl is a 48 year old male, past medical history is significant for osteoarthritis, type 2 diabetes, gout, presents to the emergency department concerns for approximate 3 days of epigastric abdominal pain that is poorly characterized can be sharp, dull, cramping, aching, burning, does not appear to be influenced by eating or drinking.  No alcohol either the 2 or 3 days preceding or throughout the course of pain.  Brief nausea couple of days ago but no vomiting and no nausea presently.  Has not taken anything specifically for the pain.  Declines offered pain medication at the time of exam.  The patient was recently initiated on allopurinol this past Friday at the same time that he self medicated with apple cider vinegar on a twice daily basis both interventions are apparently for his gout.  No change in bowel movements, no change in urination.    Allergies:  Allergies   Allergen Reactions    No Known Allergies        Problem List:    Patient Active Problem List    Diagnosis Date Noted    Primary localized osteoarthrosis of right shoulder region 01/12/2024     Priority: Medium    Injury of left shoulder, subsequent encounter 01/12/2024     Priority: Medium    Type II diabetes mellitus (H) 11/23/2021     Priority: Medium    Carpal tunnel syndrome, bilateral 10/04/2019     Priority: Medium    Cubital tunnel syndrome of both upper extremities 10/04/2019     Priority: Medium    Medial epicondylitis of right elbow 10/04/2019     Priority: Medium    Medial epicondylitis, left 10/04/2019     Priority: Medium    Osteoarthritis of left knee 10/12/2012     Priority: Medium     Formatting of this note might be different from the original.  S/p ACL repair in 1995, and arthroscopy in 1997 and 2007.      Displacement of lumbar intervertebral disc without myelopathy 04/03/2012     Priority: Medium    Lateral epicondylitis 10/20/2010      "Priority: Medium     Formatting of this note might be different from the original.  Bilateral elbows          Past Medical History:    No past medical history on file.    Past Surgical History:    Past Surgical History:   Procedure Laterality Date    ORTHOPEDIC SURGERY         Family History:    No family history on file.    Social History:  Marital Status:   [2]  Social History     Tobacco Use    Smoking status: Never    Smokeless tobacco: Never   Substance Use Topics    Alcohol use: Not Currently    Drug use: Never        Medications:    acetaminophen (TYLENOL) 500 MG tablet  allopurinol (ZYLOPRIM) 100 MG tablet  [START ON 5/10/2024] allopurinol (ZYLOPRIM) 300 MG tablet  Ascorbic Acid (VITAMIN C PO)  IBUPROFEN PO  MOUNJARO 10 MG/0.5ML pen          Review of Systems   All other systems reviewed and are negative.      Physical Exam   BP: (!) 158/97  Pulse: 69  Temp: 97.3  F (36.3  C)  Resp: 18  Height: 177.8 cm (5' 10\")  Weight: (!) 158.8 kg (350 lb)  SpO2: 95 %      Physical Exam  Vitals and nursing note reviewed.   Constitutional:       General: He is not in acute distress.     Appearance: He is well-developed. He is not ill-appearing.   HENT:      Head: Normocephalic and atraumatic.      Mouth/Throat:      Mouth: Mucous membranes are moist.      Pharynx: Oropharynx is clear.   Eyes:      Extraocular Movements: Extraocular movements intact.      Pupils: Pupils are equal, round, and reactive to light.   Cardiovascular:      Rate and Rhythm: Normal rate and regular rhythm.      Heart sounds: Normal heart sounds.   Pulmonary:      Effort: Pulmonary effort is normal.      Breath sounds: Normal breath sounds.   Abdominal:      Comments: , Bowel sounds present tender epigastrium with voluntary guarding no rebound no referred pain.  No CVA tenderness   Skin:     General: Skin is warm and dry.      Capillary Refill: Capillary refill takes less than 2 seconds.   Neurological:      General: No focal deficit present. "      Mental Status: He is alert.   Psychiatric:         Mood and Affect: Mood normal.         Behavior: Behavior normal.         ED Course        Procedures              EKG Interpretation:      Interpreted by Gonzales Nichols MD  Time reviewed: Time obtained 732 time interpreted same 67 bpm normal axis, normal intervals, no acute ST-T wave changes.  Impression normal EKG         Results for orders placed or performed during the hospital encounter of 04/24/24 (from the past 24 hour(s))   CBC with platelets, differential    Narrative    The following orders were created for panel order CBC with platelets, differential.  Procedure                               Abnormality         Status                     ---------                               -----------         ------                     CBC with platelets and d...[[id number]]  Abnormal            Final result                 Please view results for these tests on the individual orders.   Comprehensive metabolic panel   Result Value Ref Range    Sodium 139 135 - 145 mmol/L    Potassium 4.4 3.4 - 5.3 mmol/L    Carbon Dioxide (CO2) 29 22 - 29 mmol/L    Anion Gap 9 7 - 15 mmol/L    Urea Nitrogen 28.8 (H) 6.0 - 20.0 mg/dL    Creatinine 1.09 0.67 - 1.17 mg/dL    GFR Estimate 84 >60 mL/min/1.73m2    Calcium 9.1 8.6 - 10.0 mg/dL    Chloride 101 98 - 107 mmol/L    Glucose 189 (H) 70 - 99 mg/dL    Alkaline Phosphatase 104 40 - 150 U/L    AST 12 0 - 45 U/L    ALT 25 0 - 70 U/L    Protein Total 7.5 6.4 - 8.3 g/dL    Albumin 4.4 3.5 - 5.2 g/dL    Bilirubin Total 0.4 <=1.2 mg/dL   Lipase   Result Value Ref Range    Lipase 275 (H) 13 - 60 U/L   H[facility]bert Draw    Narrative    The following orders were created for panel order [facility]ulbert Draw.  Procedure                               Abnormality         Status                     ---------                               -----------         ------                     Extra Blue Top Tube[[id number]]                              Final  result               Extra Red Top Tube[[id number]]                               Final result                 Please view results for these tests on the individual orders.   CBC with platelets and differential   Result Value Ref Range    WBC Count 16.5 (H) 4.0 - 11.0 10e3/uL    RBC Count 5.51 4.40 - 5.90 10e6/uL    Hemoglobin 16.8 13.3 - 17.7 g/dL    Hematocrit 47.8 40.0 - 53.0 %    MCV 87 78 - 100 fL    MCH 30.5 26.5 - 33.0 pg    MCHC 35.1 31.5 - 36.5 g/dL    RDW 12.0 10.0 - 15.0 %    Platelet Count 267 150 - 450 10e3/uL    % Neutrophils 74 %    % Lymphocytes 18 %    % Monocytes 6 %    % Eosinophils 2 %    % Basophils 0 %    % Immature Granulocytes 1 %    NRBCs per 100 WBC 0 <1 /100    Absolute Neutrophils 12.2 (H) 1.6 - 8.3 10e3/uL    Absolute Lymphocytes 2.9 0.8 - 5.3 10e3/uL    Absolute Monocytes 1.0 0.0 - 1.3 10e3/uL    Absolute Eosinophils 0.3 0.0 - 0.7 10e3/uL    Absolute Basophils 0.1 0.0 - 0.2 10e3/uL    Absolute Immature Granulocytes 0.1 <=0.4 10e3/uL    Absolute NRBCs 0.0 10e3/uL   Extra Blue Top Tube   Result Value Ref Range    Hold Specimen JIC    Extra Red Top Tube   Result Value Ref Range    Hold Specimen JIC    Troponin T, High Sensitivity   Result Value Ref Range    Troponin T, High Sensitivity <6 <=22 ng/L   UA Macroscopic with reflex to Microscopic and Culture    Specimen: Urine, Clean Catch   Result Value Ref Range    Color Urine Straw Colorless, Straw, Light Yellow, Yellow    Appearance Urine Clear Clear    Glucose Urine Negative Negative mg/dL    Bilirubin Urine Negative Negative    Ketones Urine Negative Negative mg/dL    Specific Gravity Urine 1.011 1.003 - 1.035    Blood Urine Negative Negative    pH Urine 5.0 5.0 - 7.0    Protein Albumin Urine Negative Negative mg/dL    Urobilinogen Urine Normal Normal, 2.0 mg/dL    Nitrite Urine Negative Negative    Leukocyte Esterase Urine Negative Negative    Narrative    Microscopic not indicated   CT Abdomen Pelvis w Contrast    Narrative    EXAM: CT  ABDOMEN PELVIS W CONTRAST  LOCATION: [facility]  DATE: 4/24/2024    INDICATION: Epigastric abdominal pain, leukocytosis  COMPARISON: None.  TECHNIQUE: CT scan of the abdomen and pelvis was performed following injection of IV contrast. Multiplanar reformats were obtained. Dose reduction techniques were used.  CONTRAST: 100mL Isovue 370    FINDINGS:   LOWER CHEST: Normal.    HEPATOBILIARY: Mild hepatic steatosis. No significant mass. No bile duct dilatation. No calcified gallstones.    PANCREAS: Normal.    SPLEEN: Normal.    ADRENAL GLANDS: Normal.    KIDNEYS/BLADDER: Normal.    BOWEL: No obstruction or inflammatory change. Normal appendix.    LYMPH NODES: There are no enlarged lymph nodes in the abdomen or pelvis.    VASCULATURE: And normal caliber abdominal aorta and iliac arteries.    PELVIC ORGANS: Prostate and seminal vesicles are normal.    MUSCULOSKELETAL: Vacuum disc phenomenon at L5-S1. Moderate degenerative osteophytes of the low thoracic spine. Lumbar vertebra are maintained in height. No aggressive or destructive bone lesions.      Impression    IMPRESSION:     No mechanical bowel obstruction or focal intra-abdominal inflammation.     9:32 PM  Reviewed all diagnostics performed thus far in the room with the patient and his wife and answered their questions.  He is lipase is 275 which is significantly elevated from upper limit of normal.  As noted the patient has not had alcohol recently and I would be suspicious about the apple cider vinegar and or the allopurinol (less likely based upon review of up-to-date with respect to adverse effects from allopurinol).  We discussed potential disposition which would include potential hospital admission for n.p.o. status IV fluids and pain control versus disposition to home with cautious clear fluids until the pain is decreasing and then gradual reintroduction of soft solids.  The patient would like further IV fluids after we discussed his  elevated BUN with normal creatinine and GFR.  He will also try drinking fluids as well.      10:43 PM  Patient feeling improved and wants to go.  Reviewed symptomatic supportive care and return criteria for the emergency department.  Disposition is to home with the patient discontinuing both allopurinol and the apple cider vinegar.  He will follow-up in clinic with his primary care provider.      Medications   iopamidol (ISOVUE-370) solution 100 mL (100 mLs Intravenous $Given 4/24/24 2033)   sodium chloride 0.9 % bag 500 mL for CT scan flush use (74 mLs Intravenous $Given 4/24/24 2033)   sodium chloride 0.9% BOLUS 1,000 mL (0 mLs Intravenous Stopped 4/24/24 2234)       Assessments & Plan (with Medical Decision Making)   Assessment and plan with medical decision making at the time stamp above.      Disclaimer: This note consists of symbols derived from keyboarding, dictation and/or voice recognition software. As a result, there may be errors in the script that have gone undetected. Please consider this when interpreting information found in this chart.      I have reviewed the nursing notes.    I have reviewed the findings, diagnosis, plan and need for follow up with the patient.        New Prescriptions    No medications on file       Final diagnoses:   Acute pancreatitis without infection or necrosis, unspecified pancreatitis type       4/24/2024   [facility] EMERGENCY DEPT       Gonzales Nichols MD  04/24/24 3022

## 2024-04-25 NOTE — ED NOTES
Pt presents to ED with concerns of 3 days of constant epigastric pain. States the pain started rather suddenly Monday. Nothing specific seems to make the pain better or worse. Pt has never had anything like this. Pain is palpable all along the rib cage. Pt reports he was recently started on allopurinol for gout. Pt has also been taking apple cider vinegar for this same purpose.

## 2024-04-25 NOTE — DISCHARGE INSTRUCTIONS
Push fluids as we discussed, gradual reintroduction of soft solids as pain improves.  Tylenol for pain  Zofran if needed for nausea/vomiting.  If not improving over the course of the next 24-48 hours please return to the emergency department.  As we discussed I would recommend that you discontinue the allopurinol at the present time as well as the apple cider vinegar.  Follow-up in clinic with your primary care provider.

## 2024-04-25 NOTE — ED TRIAGE NOTES
Pt presents with upper medial abd pain that started a couple days ago and has been progressing. Pt denies N/VD.      Triage Assessment (Adult)       Row Name 04/24/24 1914          Triage Assessment    Airway WDL WDL        Respiratory WDL    Respiratory WDL WDL        Skin Circulation/Temperature WDL    Skin Circulation/Temperature WDL WDL        Cardiac WDL    Cardiac WDL WDL        Peripheral/Neurovascular WDL    Peripheral Neurovascular WDL WDL        Cognitive/Neuro/Behavioral WDL    Cognitive/Neuro/Behavioral WDL WDL

## 2024-06-23 ENCOUNTER — HEALTH MAINTENANCE LETTER (OUTPATIENT)
Age: 49
End: 2024-06-23

## 2024-09-01 ENCOUNTER — HEALTH MAINTENANCE LETTER (OUTPATIENT)
Age: 49
End: 2024-09-01

## 2024-09-06 ENCOUNTER — OFFICE VISIT (OUTPATIENT)
Dept: FAMILY MEDICINE | Facility: CLINIC | Age: 49
End: 2024-09-06
Payer: COMMERCIAL

## 2024-09-06 VITALS
DIASTOLIC BLOOD PRESSURE: 84 MMHG | OXYGEN SATURATION: 97 % | WEIGHT: 315 LBS | RESPIRATION RATE: 18 BRPM | HEIGHT: 70 IN | SYSTOLIC BLOOD PRESSURE: 128 MMHG | HEART RATE: 66 BPM | BODY MASS INDEX: 45.1 KG/M2

## 2024-09-06 DIAGNOSIS — R21 MALAR RASH: ICD-10-CM

## 2024-09-06 DIAGNOSIS — Z11.59 NEED FOR HEPATITIS C SCREENING TEST: ICD-10-CM

## 2024-09-06 DIAGNOSIS — M25.50 POLYARTHRALGIA: ICD-10-CM

## 2024-09-06 DIAGNOSIS — E66.813 CLASS 3 SEVERE OBESITY DUE TO EXCESS CALORIES WITH SERIOUS COMORBIDITY AND BODY MASS INDEX (BMI) OF 50.0 TO 59.9 IN ADULT (H): ICD-10-CM

## 2024-09-06 DIAGNOSIS — Z12.11 SCREEN FOR COLON CANCER: ICD-10-CM

## 2024-09-06 DIAGNOSIS — E66.01 CLASS 3 SEVERE OBESITY DUE TO EXCESS CALORIES WITH SERIOUS COMORBIDITY AND BODY MASS INDEX (BMI) OF 50.0 TO 59.9 IN ADULT (H): ICD-10-CM

## 2024-09-06 DIAGNOSIS — R53.82 CHRONIC FATIGUE: ICD-10-CM

## 2024-09-06 DIAGNOSIS — Z11.4 SCREENING FOR HIV (HUMAN IMMUNODEFICIENCY VIRUS): ICD-10-CM

## 2024-09-06 DIAGNOSIS — E11.65 TYPE 2 DIABETES MELLITUS WITH HYPERGLYCEMIA, WITHOUT LONG-TERM CURRENT USE OF INSULIN (H): Primary | ICD-10-CM

## 2024-09-06 DIAGNOSIS — E79.0 ELEVATED URIC ACID IN BLOOD: ICD-10-CM

## 2024-09-06 LAB
ALBUMIN SERPL BCG-MCNC: 4.4 G/DL (ref 3.5–5.2)
ALP SERPL-CCNC: 93 U/L (ref 40–150)
ALT SERPL W P-5'-P-CCNC: 29 U/L (ref 0–70)
ANION GAP SERPL CALCULATED.3IONS-SCNC: 9 MMOL/L (ref 7–15)
AST SERPL W P-5'-P-CCNC: 26 U/L (ref 0–45)
BASOPHILS # BLD AUTO: 0 10E3/UL (ref 0–0.2)
BASOPHILS NFR BLD AUTO: 0 %
BILIRUB SERPL-MCNC: 0.5 MG/DL
BUN SERPL-MCNC: 18.8 MG/DL (ref 6–20)
CALCIUM SERPL-MCNC: 9.4 MG/DL (ref 8.8–10.4)
CHLORIDE SERPL-SCNC: 101 MMOL/L (ref 98–107)
CHOLEST SERPL-MCNC: 179 MG/DL
CREAT SERPL-MCNC: 1.12 MG/DL (ref 0.67–1.17)
CREAT UR-MCNC: 45.5 MG/DL
CRP SERPL-MCNC: 5.31 MG/L
EGFRCR SERPLBLD CKD-EPI 2021: 81 ML/MIN/1.73M2
EOSINOPHIL # BLD AUTO: 0.2 10E3/UL (ref 0–0.7)
EOSINOPHIL NFR BLD AUTO: 2 %
ERYTHROCYTE [DISTWIDTH] IN BLOOD BY AUTOMATED COUNT: 11.7 % (ref 10–15)
ERYTHROCYTE [SEDIMENTATION RATE] IN BLOOD BY WESTERGREN METHOD: 5 MM/HR (ref 0–15)
FASTING STATUS PATIENT QL REPORTED: YES
FASTING STATUS PATIENT QL REPORTED: YES
FERRITIN SERPL-MCNC: 1222 NG/ML (ref 31–409)
GLUCOSE SERPL-MCNC: 133 MG/DL (ref 70–99)
HBA1C MFR BLD: 6.4 % (ref 0–5.6)
HCO3 SERPL-SCNC: 29 MMOL/L (ref 22–29)
HCT VFR BLD AUTO: 45.1 % (ref 40–53)
HCV AB SERPL QL IA: NONREACTIVE
HDLC SERPL-MCNC: 47 MG/DL
HGB BLD-MCNC: 15.5 G/DL (ref 13.3–17.7)
HIV 1+2 AB+HIV1 P24 AG SERPL QL IA: NONREACTIVE
IMM GRANULOCYTES # BLD: 0 10E3/UL
IMM GRANULOCYTES NFR BLD: 0 %
IRON BINDING CAPACITY (ROCHE): 268 UG/DL (ref 240–430)
IRON SATN MFR SERPL: 39 % (ref 15–46)
IRON SERPL-MCNC: 104 UG/DL (ref 61–157)
LDLC SERPL CALC-MCNC: 110 MG/DL
LYMPHOCYTES # BLD AUTO: 2.9 10E3/UL (ref 0.8–5.3)
LYMPHOCYTES NFR BLD AUTO: 32 %
MCH RBC QN AUTO: 29.8 PG (ref 26.5–33)
MCHC RBC AUTO-ENTMCNC: 34.4 G/DL (ref 31.5–36.5)
MCV RBC AUTO: 87 FL (ref 78–100)
MICROALBUMIN UR-MCNC: <12 MG/L
MICROALBUMIN/CREAT UR: NORMAL MG/G{CREAT}
MONOCYTES # BLD AUTO: 0.4 10E3/UL (ref 0–1.3)
MONOCYTES NFR BLD AUTO: 5 %
NEUTROPHILS # BLD AUTO: 5.4 10E3/UL (ref 1.6–8.3)
NEUTROPHILS NFR BLD AUTO: 61 %
NONHDLC SERPL-MCNC: 132 MG/DL
PLATELET # BLD AUTO: 175 10E3/UL (ref 150–450)
POTASSIUM SERPL-SCNC: 4.4 MMOL/L (ref 3.4–5.3)
PROT SERPL-MCNC: 7.2 G/DL (ref 6.4–8.3)
RBC # BLD AUTO: 5.21 10E6/UL (ref 4.4–5.9)
SODIUM SERPL-SCNC: 139 MMOL/L (ref 135–145)
TOTAL PROTEIN SERUM FOR ELP: 6.9 G/DL (ref 6.4–8.3)
TRIGL SERPL-MCNC: 112 MG/DL
TSH SERPL DL<=0.005 MIU/L-ACNC: 3.24 UIU/ML (ref 0.3–4.2)
URATE SERPL-MCNC: 7.2 MG/DL (ref 3.4–7)
VIT D+METAB SERPL-MCNC: 26 NG/ML (ref 20–50)
WBC # BLD AUTO: 8.9 10E3/UL (ref 4–11)

## 2024-09-06 PROCEDURE — 83540 ASSAY OF IRON: CPT | Performed by: STUDENT IN AN ORGANIZED HEALTH CARE EDUCATION/TRAINING PROGRAM

## 2024-09-06 PROCEDURE — 82306 VITAMIN D 25 HYDROXY: CPT | Performed by: STUDENT IN AN ORGANIZED HEALTH CARE EDUCATION/TRAINING PROGRAM

## 2024-09-06 PROCEDURE — G2211 COMPLEX E/M VISIT ADD ON: HCPCS | Performed by: STUDENT IN AN ORGANIZED HEALTH CARE EDUCATION/TRAINING PROGRAM

## 2024-09-06 PROCEDURE — 86803 HEPATITIS C AB TEST: CPT | Performed by: STUDENT IN AN ORGANIZED HEALTH CARE EDUCATION/TRAINING PROGRAM

## 2024-09-06 PROCEDURE — 84165 PROTEIN E-PHORESIS SERUM: CPT | Performed by: PATHOLOGY

## 2024-09-06 PROCEDURE — 84155 ASSAY OF PROTEIN SERUM: CPT | Mod: 59 | Performed by: STUDENT IN AN ORGANIZED HEALTH CARE EDUCATION/TRAINING PROGRAM

## 2024-09-06 PROCEDURE — 85025 COMPLETE CBC W/AUTO DIFF WBC: CPT | Performed by: STUDENT IN AN ORGANIZED HEALTH CARE EDUCATION/TRAINING PROGRAM

## 2024-09-06 PROCEDURE — 84550 ASSAY OF BLOOD/URIC ACID: CPT | Performed by: STUDENT IN AN ORGANIZED HEALTH CARE EDUCATION/TRAINING PROGRAM

## 2024-09-06 PROCEDURE — 82043 UR ALBUMIN QUANTITATIVE: CPT | Performed by: STUDENT IN AN ORGANIZED HEALTH CARE EDUCATION/TRAINING PROGRAM

## 2024-09-06 PROCEDURE — 83036 HEMOGLOBIN GLYCOSYLATED A1C: CPT | Performed by: STUDENT IN AN ORGANIZED HEALTH CARE EDUCATION/TRAINING PROGRAM

## 2024-09-06 PROCEDURE — 82570 ASSAY OF URINE CREATININE: CPT | Performed by: STUDENT IN AN ORGANIZED HEALTH CARE EDUCATION/TRAINING PROGRAM

## 2024-09-06 PROCEDURE — 85652 RBC SED RATE AUTOMATED: CPT | Performed by: STUDENT IN AN ORGANIZED HEALTH CARE EDUCATION/TRAINING PROGRAM

## 2024-09-06 PROCEDURE — 86618 LYME DISEASE ANTIBODY: CPT | Performed by: STUDENT IN AN ORGANIZED HEALTH CARE EDUCATION/TRAINING PROGRAM

## 2024-09-06 PROCEDURE — 36415 COLL VENOUS BLD VENIPUNCTURE: CPT | Performed by: STUDENT IN AN ORGANIZED HEALTH CARE EDUCATION/TRAINING PROGRAM

## 2024-09-06 PROCEDURE — 82728 ASSAY OF FERRITIN: CPT | Performed by: STUDENT IN AN ORGANIZED HEALTH CARE EDUCATION/TRAINING PROGRAM

## 2024-09-06 PROCEDURE — 86140 C-REACTIVE PROTEIN: CPT | Performed by: STUDENT IN AN ORGANIZED HEALTH CARE EDUCATION/TRAINING PROGRAM

## 2024-09-06 PROCEDURE — 83550 IRON BINDING TEST: CPT | Performed by: STUDENT IN AN ORGANIZED HEALTH CARE EDUCATION/TRAINING PROGRAM

## 2024-09-06 PROCEDURE — 99214 OFFICE O/P EST MOD 30 MIN: CPT | Performed by: STUDENT IN AN ORGANIZED HEALTH CARE EDUCATION/TRAINING PROGRAM

## 2024-09-06 PROCEDURE — 86160 COMPLEMENT ANTIGEN: CPT | Performed by: STUDENT IN AN ORGANIZED HEALTH CARE EDUCATION/TRAINING PROGRAM

## 2024-09-06 PROCEDURE — 87389 HIV-1 AG W/HIV-1&-2 AB AG IA: CPT | Performed by: STUDENT IN AN ORGANIZED HEALTH CARE EDUCATION/TRAINING PROGRAM

## 2024-09-06 PROCEDURE — 80053 COMPREHEN METABOLIC PANEL: CPT | Performed by: STUDENT IN AN ORGANIZED HEALTH CARE EDUCATION/TRAINING PROGRAM

## 2024-09-06 PROCEDURE — 86162 COMPLEMENT TOTAL (CH50): CPT | Mod: 90 | Performed by: STUDENT IN AN ORGANIZED HEALTH CARE EDUCATION/TRAINING PROGRAM

## 2024-09-06 PROCEDURE — 99207 PR FOOT EXAM NO CHARGE: CPT | Performed by: STUDENT IN AN ORGANIZED HEALTH CARE EDUCATION/TRAINING PROGRAM

## 2024-09-06 PROCEDURE — 84443 ASSAY THYROID STIM HORMONE: CPT | Performed by: STUDENT IN AN ORGANIZED HEALTH CARE EDUCATION/TRAINING PROGRAM

## 2024-09-06 PROCEDURE — 80061 LIPID PANEL: CPT | Performed by: STUDENT IN AN ORGANIZED HEALTH CARE EDUCATION/TRAINING PROGRAM

## 2024-09-06 PROCEDURE — 99000 SPECIMEN HANDLING OFFICE-LAB: CPT | Performed by: STUDENT IN AN ORGANIZED HEALTH CARE EDUCATION/TRAINING PROGRAM

## 2024-09-06 ASSESSMENT — PAIN SCALES - GENERAL: PAINLEVEL: EXTREME PAIN (8)

## 2024-09-06 NOTE — PROGRESS NOTES
"  Assessment & Plan     Type 2 diabetes mellitus with hyperglycemia, without long-term current use of insulin (H)  Patient is a pleasant 48 year old who presents today for follow up diabetes. Due for below labs, completed today. Will consider ozempic for improvement in diabetes control, decrease CVD risk, weight pending results. Also discussed consideration of SGLT2 given prior renal function decline, however, patient has difficulties remembering daily medication.   - HEMOGLOBIN A1C  - Lipid panel reflex to direct LDL Non-fasting  - Albumin Random Urine Quantitative with Creat Ratio  - Comprehensive metabolic panel  - Comprehensive metabolic panel  - Albumin Random Urine Quantitative with Creat Ratio  - Lipid panel reflex to direct LDL Non-fasting  - HEMOGLOBIN A1C  - FOOT EXAM    Malar rash  Noted previous issues with \"butterfly rash\" not significant on exam today. Also has generalized inflammation, ongoing joint pains. We expanded our rheumatologic workup today, though ultimately feel this is generally low likelihood for clear cause. Most suspect diabetes uncontrolled, inflammatory diet, and elevated uric acid.   Consider DASH or Mediterranean diet for a more antiinflammatory diet.  - CBC with platelets and differential  - Comprehensive metabolic panel  - Erythrocyte sedimentation rate auto  - CRP inflammation  - Complement C4  - Complement C3  - Complement Activity Total (CH50)  - Protein electrophoresis  - Protein electrophoresis  - Complement Activity Total (CH50)  - Complement C3  - Complement C4  - CRP inflammation  - Erythrocyte sedimentation rate auto  - Comprehensive metabolic panel  - CBC with platelets and differential    Polyarthralgia  As above. Follow up with orthopedics for joint injections.   - CBC with platelets and differential  - Comprehensive metabolic panel  - Erythrocyte sedimentation rate auto  - CRP inflammation  - Complement C4  - Complement C3  - Complement Activity Total (CH50)  - Protein " "electrophoresis  - Protein electrophoresis  - Complement Activity Total (CH50)  - Complement C3  - Complement C4  - CRP inflammation  - Erythrocyte sedimentation rate auto  - Comprehensive metabolic panel  - CBC with platelets and differential    Elevated uric acid in blood  Previously elevated, recheck today. Had pancreatitis episode, possibly due to the allopurinol. Would preferentially avoid allopurinol moving forward.   - Uric acid  - Uric acid    Chronic fatigue  Ongoing fatigue. Labs completed below for further eval.   - TSH with free T4 reflex  - Vitamin D deficiency screening  - Iron and iron binding capacity  - Ferritin  - Ferritin  - Iron and iron binding capacity  - Vitamin D deficiency screening  - TSH with free T4 reflex    Screen for colon cancer  - Colonoscopy Screening  Referral    Screening for HIV (human immunodeficiency virus)  - HIV Antigen Antibody Combo  - HIV Antigen Antibody Combo    Need for hepatitis C screening test  - Hepatitis C Screen Reflex to HCV RNA Quant and Genotype  - Hepatitis C Screen Reflex to HCV RNA Quant and Genotype    Class 3 severe obesity due to excess calories with serious comorbidity and body mass index (BMI) of 50.0 to 59.9 in adult (H)  Did not discuss in detail today. May benefit from GLP1 again (did lose about 20 lbs on mounjaro). Continues to work on lifestyle changes.     BMI  Estimated body mass index is 50.08 kg/m  as calculated from the following:    Height as of this encounter: 1.778 m (5' 10\").    Weight as of this encounter: 158.3 kg (349 lb).     The longitudinal plan of care for the diagnosis(es)/condition(s) as documented were addressed during this visit. Due to the added complexity in care, I will continue to support Clarence in the subsequent management and with ongoing continuity of care.    I spent a total of 35 minutes on the day of the visit.   Time spent by me doing chart review, history and exam, documentation and further activities per the " "note    Subjective   Clarence is a 48 year old, presenting for the following health issues:  Diabetes        9/6/2024     9:02 AM   Additional Questions   Roomed by Aria ANN   Accompanied by Wife     History of Present Illness       Diabetes:   He presents for follow up of diabetes.    He is not checking blood glucose.         He has no concerns regarding his diabetes at this time.  He is having numbness in feet, excessive thirst, blurry vision and weight gain.  The patient has not had a diabetic eye exam in the last 12 months.          He eats 2-3 servings of fruits and vegetables daily.He consumes 1 sweetened beverage(s) daily.He exercises with enough effort to increase his heart rate 9 or less minutes per day.  He exercises with enough effort to increase his heart rate 3 or less days per week.   He is taking medications regularly.     Foot numbness. Both feet.sometimes toes and fingers (also has carpal tunnel history).     Gout - elevated uric acid in the past.     Varicose veins a long time ago, was seen for it, stopped using the compression socks.     Stopped mounjaro last year after he saw me. Got expensive and kidney function issues.     Sometimes takes hourse to get the shoulder to loosen up.     Gets butterfly rash      Review of Systems  Constitutional, HEENT, cardiovascular, pulmonary, gi and gu systems are negative, except as otherwise noted.      Objective    /84 (BP Location: Right arm, Patient Position: Sitting, Cuff Size: Adult Large)   Pulse 66   Resp 18   Ht 1.778 m (5' 10\")   Wt (!) 158.3 kg (349 lb)   SpO2 97%   BMI 50.08 kg/m    Body mass index is 50.08 kg/m .  Physical Exam  Constitutional:       Appearance: Normal appearance.   HENT:      Head: Normocephalic.   Eyes:      General: No scleral icterus.     Extraocular Movements: Extraocular movements intact.      Conjunctiva/sclera: Conjunctivae normal.   Cardiovascular:      Rate and Rhythm: Normal rate and regular rhythm.      Heart " sounds: Normal heart sounds.   Pulmonary:      Effort: Pulmonary effort is normal.   Musculoskeletal:         General: Normal range of motion.      Cervical back: Normal range of motion.   Neurological:      General: No focal deficit present.      Mental Status: He is alert and oriented to person, place, and time.            Diabetic Foot Screen:  Any complaints of increased pain or numbness ?  YES intermittent toe numbness  Is there a foot ulcer now or a history of foot ulcer? No  Does the foot have an abnormal shape?  YES flat  Are the nails thick, too long or ingrown? No  Are there any redness or open areas? No         Sensation Testing done at all points on the diagram with monofilament     Right Foot: Sensation Normal at all points  Left Foot: Sensation Normal at all points     Risk Category: 0- No loss of protective sensation  Performed by Felicia Ochoa MD            Signed Electronically by: Felicia Ochoa MD

## 2024-09-07 LAB — CH50 SERPL-ACNC: 90.4 U/ML

## 2024-09-09 LAB
ALBUMIN SERPL ELPH-MCNC: 4.3 G/DL (ref 3.7–5.1)
ALPHA1 GLOB SERPL ELPH-MCNC: 0.3 G/DL (ref 0.2–0.4)
ALPHA2 GLOB SERPL ELPH-MCNC: 0.5 G/DL (ref 0.5–0.9)
B-GLOBULIN SERPL ELPH-MCNC: 0.9 G/DL (ref 0.6–1)
C3 SERPL-MCNC: 149 MG/DL (ref 81–157)
C4 SERPL-MCNC: 22 MG/DL (ref 13–39)
GAMMA GLOB SERPL ELPH-MCNC: 0.8 G/DL (ref 0.7–1.6)
M PROTEIN SERPL ELPH-MCNC: 0 G/DL
PROT PATTERN SERPL ELPH-IMP: NORMAL

## 2024-09-10 LAB — B BURGDOR IGG+IGM SER QL: 0.1

## 2024-09-20 ENCOUNTER — MYC MEDICAL ADVICE (OUTPATIENT)
Dept: FAMILY MEDICINE | Facility: CLINIC | Age: 49
End: 2024-09-20

## 2024-09-20 NOTE — PATIENT INSTRUCTIONS
Pediatric Dermatology  [place] [id number] [place] [id number]  [phone]    General Gentle Skin Care Recommendations  The products listed are not exclusive and generic products or others not on the list may be an okay substitute, but make sure they are fragrance free and reading labels is very important    Below is a list of products our providers recommend for gentle skin care.  Moisturizers:    Lighter; Cetaphil Cream, CeraVe Cream, Aveeno Positively Radiant, Pipette, Vanicream lotion    Thicker; Aquaphor Ointment, Vaseline, Petroleum Jelly, Eucerin Original Healing Cream, Vanicream Cream, CeraVe Healing Ointment, Aquaphor Body Spray, Vanicream    Lotions are too thin to provide adequate moisture to the skin. Thicker options such as creams or ointments are recommended  Mild Cleansers:    Dove- Fragrance Free bar or wash  CeraVe   Eucerin Baby  Vanicream Cleansing bar  Cetaphil Cleanser   Aquaphor 2 in1 Gentle Wash and Shampoo  Dove Baby wash  Pipette Fragrance Free  CLn wash        Laundry Products:    All Free and Clear  Cheer Free  Generic Brands are okay if they are  Fragrance Free      Avoid fabric softeners and dryer sheets. These add unnecessary chemicals to clothing Sunscreens: SPF 30 or greater     Choose mineral-based sunscreens with active ingredients of only Zinc Oxide and/or Titanium Dioxide   It is safe to apply a small amount of mineral-based sunscreen to sun-exposed skin on infants under 6 months of age (face, hands, etc.)     Examples:  Aveeno Active Natural Protection Mineral Block Lotion SPF 30  Blue Lizard for Sensitive Skin SPF 30+  Mustella Broad Spectrum SPF 50+/Mineral Sunscreen Stick    Thinkbaby Safe Sunscreen SPF 50+  Vanicream Sunscreen for Sensitive Skin SPF 30 or 50  [facility] s Sensitive Skin SPF 70    Avoid spray sunscreens. Most contain chemical sunscreen ingredients and can be easily inhaled during application     Shampoo and  Conditioners:  (Some baby washes may be used as a shampoo)    Free and Clear by Vanicream  Aquaphor 2 in 1 Gentle Wash and Shampoo  Pipette Baby Fragrance Free  Mustela Fragrance Free   Sheen Shampoo   CLn Shampoo    Oils:  Mineral Oil   Coconut (raw, unrefined, organic)   Sunflower seed oil     Avoid olive oil   Avoid essential oils (see below)         Why fragrance free?  Infant skin is thinner than adult skin and is more prone to irritation and absorption of fragrance or chemical ingredients. Fragrances can irritate the skin of infants and children with eczema.     Why avoid essential oils on the skin?  Essential oils like lavender are very concentrated and will be absorbed into an infant s skin.   Essential oils can be very irritating and cause severe rash on the skin   Lavender and other essential oils are commonly found in baby care products. When these products are applied repeatedly to the skin and/or occluded in the diaper region, this can enhance the risk for absorption or irritation.       What about  organic  or  natural  products?  Organic or natural does not mean  fragrance free  or gentle. In fact, many organic products are very irritating to the skin  Patients with sensitive skin may be sensitive to ingredients like fragrance, essential oils, or botanical extracts in these products.    Bathing and moisturization recommendations:  Bathe once daily. Soaking in a bath is more hydrating for the skin than a shower.  Keep bathing and showering to less than 15 minutes   Use warm water, but AVOID HOT or COLD water  DO NOT use bubble bath or other products which excessively foam. These strip moisture from the skin  Limit the use of soaps, focusing on the skin folds, face, armpits, groin and feet.  Do NOT vigorously scrub when you cleanse the skin or use a loofa  After bathing, PAT your skin lightly with a towel. DO NOT rub or scrub when drying  ALWAYS apply moisturizer immediately after bathing. This helps to   lock in  the moisture.   Reapply moisturizers at least twice daily to your whole body   Your provider may recommend a lighter or heavier moisturizer based on your child s skin condition.  We recommend ointment-based moisturizers or thick creams. Avoid lotions as they are not thick enough to hydrate the skin and often contain irritating chemicals and preservatives  Lotions and thinner creams can sting and burn when applied. Ointment-based moisturizers are better tolerated when skin is inflamed or if there are open wounds.   If you were prescribed a topical medication, follow the instructions for application as provided by your pediatric dermatology provider, but typically these should be applied first before applying moisturizer    Other helpful tips:  Avoid scented products such as powders, perfumes, or colognes  Essential oil diffusers can be harsh on sensitive skin, use with caution   Avoid saunas and steam baths. (This temperature is too HOT)  Choose breathable clothing such as cotton or bamboo   Avoid tight or  scratchy  clothing such as wool or polyester   Always wash new clothing before wearing them for the first time  Sometimes a humidifier or vaporizer can be used at night to help the dry skin. Remember to keep these items clean to avoid mold growth

## 2024-10-30 NOTE — CONFIDENTIAL NOTE
RECORDS RECEIVED FROM: internal    DATE RECEIVED: 1.18.24    NOTES (FOR ALL VISITS) STATUS DETAILS   OFFICE NOTES from referring provider internal  Emigdio Hopkins MD    OFFICE NOTES from other specialist internal  9.6.24 [facility]   1.12.24- reji/Karon      ED NOTES internal  4.24.24 [facility]      MEDICATION LIST internal     IMAGING      XR (Chest) internal  1.17.24    CT (HEAD/NECK/CHEST/ABDOMEN) internal  4.24.24, 5.2.23   LABS     DIABETES: HBGA1C, CREATININE, FASTING LIPIDS, MICROALBUMIN URINE, POTASSIUM, TSH, T4    THYROID: TSH, T4, CBC, THYRODLONULIN, TOTAL T3, FREE T4, CALCITONIN, CEA internal

## 2024-11-18 ENCOUNTER — PRE VISIT (OUTPATIENT)
Dept: ENDOCRINOLOGY | Facility: CLINIC | Age: 49
End: 2024-11-18

## 2025-01-04 ENCOUNTER — HEALTH MAINTENANCE LETTER (OUTPATIENT)
Age: 50
End: 2025-01-04

## 2025-01-30 NOTE — CONFIDENTIAL NOTE
RECORDS RECEIVED FROM: internal    DATE RECEIVED: 4.25.25    NOTES (FOR ALL VISITS) STATUS DETAILS   OFFICE NOTES from referring provider internal    Emigdio Hopkins MD      OFFICE NOTES from other specialist internal  9.6.24 [place]      MEDICATION LIST internal     IMAGING      XR (Chest) internal  1.17.24   CT (HEAD/NECK/CHEST/ABDOMEN) internal  4.24.24, 5.2.23    LABS     DIABETES: HBGA1C, CREATININE, FASTING LIPIDS, MICROALBUMIN URINE, POTASSIUM, TSH, T4    THYROID: TSH, T4, CBC, THYRODLONULIN, TOTAL T3, FREE T4, CALCITONIN, CEA internal

## 2025-02-02 ENCOUNTER — MYC MEDICAL ADVICE (OUTPATIENT)
Dept: FAMILY MEDICINE | Facility: CLINIC | Age: 50
End: 2025-02-02
Payer: COMMERCIAL

## 2025-02-02 DIAGNOSIS — E11.65 TYPE 2 DIABETES MELLITUS WITH HYPERGLYCEMIA, WITHOUT LONG-TERM CURRENT USE OF INSULIN (H): ICD-10-CM

## 2025-02-02 DIAGNOSIS — Z87.19 HISTORY OF PANCREATITIS: Primary | ICD-10-CM

## 2025-02-03 RX ORDER — SEMAGLUTIDE 1.34 MG/ML
1 INJECTION, SOLUTION SUBCUTANEOUS WEEKLY
Qty: 9 ML | Refills: 3 | Status: SHIPPED | OUTPATIENT
Start: 2025-03-31

## 2025-02-03 RX ORDER — SEMAGLUTIDE 0.68 MG/ML
INJECTION, SOLUTION SUBCUTANEOUS
Qty: 6 ML | Refills: 0 | Status: SHIPPED | OUTPATIENT
Start: 2025-02-03 | End: 2025-03-31

## 2025-02-19 ENCOUNTER — MEDICAL CORRESPONDENCE (OUTPATIENT)
Dept: HEALTH INFORMATION MANAGEMENT | Facility: CLINIC | Age: 50
End: 2025-02-19

## 2025-03-24 ENCOUNTER — TELEPHONE (OUTPATIENT)
Dept: ENDOCRINOLOGY | Facility: CLINIC | Age: 50
End: 2025-03-24
Payer: COMMERCIAL

## 2025-03-24 NOTE — TELEPHONE ENCOUNTER
Left Voicemail (1st Attempt) for the patient to call back and schedule the following:    Appointment type: NEW DIABETES  Provider: Dyan Villatoro PA-C  Return date: Jossy 4/25/25  Specialty phone number: [phone]  Additional appointment(s) needed: N/A  Additonal Notes:  LVM, MyCX1, Due to changes in the providers schedule appt on 4/25 needs to get rescheduled to first available or options below.      Examples:  4/9 [facility] ([facility]) 2 return slots  4/14 [facility] ([facility])2 return slots    Taye Liu on 3/24/2025 at 12:50 PM

## 2025-03-26 NOTE — TELEPHONE ENCOUNTER
Left Voicemail (2nd Attempt) for the patient to call back and schedule the following:     Appointment type: NEW DIABETES  Provider: Dyan Villatoro PA-C  Return date: Jossy 4/25/25  Specialty phone number: [phone]  Additional appointment(s) needed: N/A  Additonal Notes:  LVMx2, MyCX1, Due to changes in the providers schedule appt on 4/25 needs to get rescheduled to first available slot.  This appointment is now canceled,    Taye Liu on 3/26/2025 at 12:12 PM

## 2025-04-04 PROBLEM — E66.813 CLASS 3 SEVERE OBESITY DUE TO EXCESS CALORIES WITH SERIOUS COMORBIDITY AND BODY MASS INDEX (BMI) OF 50.0 TO 59.9 IN ADULT (H): Status: ACTIVE | Noted: 2024-09-06

## 2025-04-04 NOTE — H&P (VIEW-ONLY)
Preoperative Evaluation  [facility]  [address] [place] [id number]  Phone: [phone]  Fax: [phone]  Primary Provider: Felicia Ochoa MD  Pre-op Performing Provider: Felicia Ochoa MD  Apr 4, 2025 4/2/2025   Surgical Information   What procedure is being done? Left knee surgery   Facility or Hospital where procedure/surgery will be performed: [facility]   Who is doing the procedure / surgery? Dr scott   Date of surgery / procedure: 4/18/25   Time of surgery / procedure: Not sure yet   Where do you plan to recover after surgery? at home with family     Fax number for surgical facility: Note does not need to be faxed, will be available electronically in Epic.    Assessment & Plan     The proposed surgical procedure is considered INTERMEDIATE risk.    Preop general physical exam  Chronic pain of left knee  Sprain of lateral collateral ligament of left knee, subsequent encounter  Strain of patellar tendon, left, subsequent encounter  Patient is a very pleasant 49 year old who presents today for preop evaluation. See risk factors below. No further workup indicated. Okay for surgery.    Type 2 diabetes mellitus without complication, without long-term current use of insulin (H)  Skip Ozempic on 4/13. Can restart the next Sunday. Will have him continue to taper up on ozempic, go to 1 mg weekly dose when he fills next Rx.     Class 3 severe obesity due to excess calories with serious comorbidity and body mass index (BMI) of 50.0 to 59.9 in adult (H)  Does also have sleep apnea diabetes. Monitor weight on Ozempic.       Possible Sleep Apnea: is diagnosed with XENIA, has a cpap, but doesn't use it, can't keep it on his face.      Risks and Recommendations  The patient has the following additional risks and recommendations for perioperative complications:   - Morbid obesity (BMI >40)  Diabetes:  - Patient is not on insulin therapy: regular NPO  guidelines can be followed.   Obstructive Sleep Apnea:   Masks don't work well, will not bring cpap    Antiplatelet or Anticoagulation Medication Instructions   - We reviewed the medication list and the patient is not on an antiplatelet or anticoagulation medications.    Additional Medication Instructions  Take all scheduled medications on the day of surgery EXCEPT for modifications listed below:   - Herbal medications and vitamins: DO NOT TAKE 14 days prior to surgery.   - GLP-1 Injectable (exenitide, liraglutide, semaglutide, dulaglutide, etc.): DO NOT TAKE 7 days before surgery    - ibuprofen (Advil, Motrin): DO NOT TAKE 1 day before surgery.       Recommendation  Approval given to proceed with proposed procedure, without further diagnostic evaluation.      I spent a total of 25 minutes on the day of the visit.   Time spent by me today doing chart review, history and exam, documentation and further activities per the note    Subjective   Clarence is a 49 year old, presenting for the following:  Pre-Op Exam          4/4/2025     9:51 AM   Additional Questions   Roomed by Aria ANN   Accompanied by Self     HPI:       Does injection Sundays      4/2/2025   Pre-Op Questionnaire   Have you ever had a heart attack or stroke? No   Have you ever had surgery on your heart or blood vessels, such as a stent placement, a coronary artery bypass, or surgery on an artery in your head, neck, heart, or legs? No   Do you have chest pain with activity? No   Do you have a history of heart failure? No   Do you currently have a cold, bronchitis or symptoms of other infection? No   Do you have a cough, shortness of breath, or wheezing? No   Do you or anyone in your family have previous history of blood clots? No   Do you or does anyone in your family have a serious bleeding problem such as prolonged bleeding following surgeries or cuts? No   Have you ever had problems with anemia or been told to take iron pills? No   Have you had any  abnormal blood loss such as black, tarry or bloody stools? No   Have you ever had a blood transfusion? No   Are you willing to have a blood transfusion if it is medically needed before, during, or after your surgery? Yes   Have you or any of your relatives ever had problems with anesthesia? No   Do you have sleep apnea, excessive snoring or daytime drowsiness? (!) YES   Do you have a CPAP machine? (!) NO, doesn't use it.    Do you have any artifical heart valves or other implanted medical devices like a pacemaker, defibrillator, or continuous glucose monitor? No   Do you have artificial joints? None.    Are you allergic to latex? No     No problems with anesthesia in the past     Health Care Directive  Patient does not have a Health Care Directive: Discussed advance care planning with patient; information given to patient to review.    Preoperative Review of    reviewed - recent prescription 1 month ago for norco 10 tablets.       Status of Chronic Conditions:  DIABETES - Patient has a longstanding history of DiabetesType Type II . Patient is being treated with GLP1 and denies significant side effects. Control has been fair. Complicating factors include but are not limited to: morbid obesity .     Patient Active Problem List    Diagnosis Date Noted    Class 3 severe obesity due to excess calories with serious comorbidity and body mass index (BMI) of 50.0 to 59.9 in adult (H) 09/06/2024     Priority: Medium    Primary localized osteoarthrosis of right shoulder region 01/12/2024     Priority: Medium    Injury of left shoulder, subsequent encounter 01/12/2024     Priority: Medium    Type II diabetes mellitus (H) 11/23/2021     Priority: Medium    Carpal tunnel syndrome, bilateral 10/04/2019     Priority: Medium    Cubital tunnel syndrome of both upper extremities 10/04/2019     Priority: Medium    Medial epicondylitis of right elbow 10/04/2019     Priority: Medium    Medial epicondylitis, left 10/04/2019      "Priority: Medium    Osteoarthritis of left knee 10/12/2012     Priority: Medium     Formatting of this note might be different from the original.  S/p ACL repair in 1995, and arthroscopy in 1997 and 2007.      Displacement of lumbar intervertebral disc without myelopathy 04/03/2012     Priority: Medium    Lateral epicondylitis 10/20/2010     Priority: Medium     Formatting of this note might be different from the original.  Bilateral elbows        Past Medical History:   Diagnosis Date    Arthritis     Diabetes (H)      Past Surgical History:   Procedure Laterality Date    ORTHOPEDIC SURGERY       Current Outpatient Medications   Medication Sig Dispense Refill    acetaminophen (TYLENOL) 500 MG tablet Take 500-1,000 mg by mouth every 6 hours as needed for mild pain.      Ascorbic Acid (VITAMIN C PO) Take 1 tablet by mouth daily.      IBUPROFEN PO       insulin pen needle (32G X 4 MM) 32G X 4 MM miscellaneous Use 1 pen needle weekly or as directed. 90 each 1    magnesium 250 MG tablet Take 1 tablet by mouth daily.      Semaglutide, 1 MG/DOSE, (OZEMPIC, 1 MG/DOSE,) 4 MG/3ML pen Inject 1 mg subcutaneously once a week. 9 mL 3    Zinc Sulfate (ZINC 15 PO) Take by mouth.         Allergies   Allergen Reactions    No Known Allergies         Social History     Tobacco Use    Smoking status: Never    Smokeless tobacco: Never   Substance Use Topics    Alcohol use: Not Currently     No family history on file.  History   Drug Use Unknown             Review of Systems  Constitutional, HEENT, cardiovascular, pulmonary, gi and gu systems are negative, except as otherwise noted.    Objective    /86 (BP Location: Right arm, Patient Position: Sitting, Cuff Size: Adult Large)   Pulse 74   Temp 97.5  F (36.4  C) (Tympanic)   Resp 16   Ht 1.778 m (5' 10\")   Wt (!) 158.3 kg (349 lb)   SpO2 100%   BMI 50.08 kg/m     Estimated body mass index is 50.08 kg/m  as calculated from the following:    Height as of this encounter: 1.778 " "m (5' 10\").    Weight as of this encounter: 158.3 kg (349 lb).  Physical Exam  GENERAL: alert and no distress  EYES: Eyes grossly normal to inspection, and conjunctivae and sclerae normal  HENT: ear canals and TM's normal, nose and mouth without ulcers or lesions  NECK: no adenopathy, no asymmetry, masses, or scars  RESP: lungs clear to auscultation - no rales, rhonchi or wheezes  CV: regular rate and rhythm, normal S1 S2, no S3 or S4, no murmur, click or rub, no peripheral edema  ABDOMEN: soft, nontender, no hepatosplenomegaly, no masses and bowel sounds normal  MS: no gross musculoskeletal defects noted, no edema  SKIN: no suspicious lesions or rashes  NEURO: Normal strength and tone, mentation intact and speech normal  PSYCH: mentation appears normal, affect normal/bright    Recent Labs   Lab Test 02/14/25  0900 09/06/24  0950 04/24/24  1924   HGB  --  15.5 16.8   PLT  --  175 267    139 139   POTASSIUM 4.7 4.4 4.4   CR 1.17 1.12 1.09   A1C 7.5* 6.4*  --         Diagnostics  No labs were ordered during this visit.   No EKG required, no history of coronary heart disease, significant arrhythmia, peripheral arterial disease or other structural heart disease.    Revised Cardiac Risk Index (RCRI)  The patient has the following serious cardiovascular risks for perioperative complications:   - No serious cardiac risks = 0 points     RCRI Interpretation: 0 points: Class I (very low risk - 0.4% complication rate)         Signed Electronically by: Felicia Ochoa MD  A copy of this evaluation report is provided to the requesting physician.         "

## 2025-04-09 NOTE — OR NURSING
Message left for Clarence Stahl reminding  patient  that per anesthesia protocol, he should hold his Ozempic  starting 4/11 due to his forth coming surgery on 4/18. Call back number  ( [phone]) provided to the patient.

## 2025-04-18 ENCOUNTER — HOSPITAL ENCOUNTER (OUTPATIENT)
Facility: CLINIC | Age: 50
Discharge: HOME | End: 2025-04-18
Attending: STUDENT IN AN ORGANIZED HEALTH CARE EDUCATION/TRAINING PROGRAM | Admitting: STUDENT IN AN ORGANIZED HEALTH CARE EDUCATION/TRAINING PROGRAM
Payer: COMMERCIAL

## 2025-04-18 VITALS
WEIGHT: 315 LBS | OXYGEN SATURATION: 94 % | BODY MASS INDEX: 44.1 KG/M2 | HEIGHT: 71 IN | TEMPERATURE: 78.6 F | RESPIRATION RATE: 16 BRPM | DIASTOLIC BLOOD PRESSURE: 97 MMHG | SYSTOLIC BLOOD PRESSURE: 145 MMHG | HEART RATE: 86 BPM

## 2025-04-18 PROCEDURE — 360N000077 HC SURGERY LEVEL 4, PER MIN: Performed by: STUDENT IN AN ORGANIZED HEALTH CARE EDUCATION/TRAINING PROGRAM

## 2025-04-18 PROCEDURE — 710N000012 HC RECOVERY PHASE 2, PER MINUTE: Performed by: STUDENT IN AN ORGANIZED HEALTH CARE EDUCATION/TRAINING PROGRAM

## 2025-04-18 PROCEDURE — 258N000003 HC RX IP 258 OP 636: Performed by: ANESTHESIOLOGY

## 2025-04-18 PROCEDURE — C1713 ANCHOR/SCREW BN/BN,TIS/BN: HCPCS | Performed by: STUDENT IN AN ORGANIZED HEALTH CARE EDUCATION/TRAINING PROGRAM

## 2025-04-18 PROCEDURE — 250N000013 HC RX MED GY IP 250 OP 250 PS 637: Performed by: STUDENT IN AN ORGANIZED HEALTH CARE EDUCATION/TRAINING PROGRAM

## 2025-04-18 PROCEDURE — 250N000025 HC SEVOFLURANE, PER MIN: Performed by: STUDENT IN AN ORGANIZED HEALTH CARE EDUCATION/TRAINING PROGRAM

## 2025-04-18 PROCEDURE — 370N000017 HC ANESTHESIA TECHNICAL FEE, PER MIN: Performed by: STUDENT IN AN ORGANIZED HEALTH CARE EDUCATION/TRAINING PROGRAM

## 2025-04-18 PROCEDURE — 999N000141 HC STATISTIC PRE-PROCEDURE NURSING ASSESSMENT: Performed by: STUDENT IN AN ORGANIZED HEALTH CARE EDUCATION/TRAINING PROGRAM

## 2025-04-18 PROCEDURE — 710N000009 HC RECOVERY PHASE 1, LEVEL 1, PER MIN: Performed by: STUDENT IN AN ORGANIZED HEALTH CARE EDUCATION/TRAINING PROGRAM

## 2025-04-18 PROCEDURE — 250N000009 HC RX 250: Performed by: STUDENT IN AN ORGANIZED HEALTH CARE EDUCATION/TRAINING PROGRAM

## 2025-04-18 PROCEDURE — 250N000011 HC RX IP 250 OP 636: Performed by: STUDENT IN AN ORGANIZED HEALTH CARE EDUCATION/TRAINING PROGRAM

## 2025-04-18 PROCEDURE — 250N000011 HC RX IP 250 OP 636: Performed by: ANESTHESIOLOGY

## 2025-04-18 PROCEDURE — 272N000001 HC OR GENERAL SUPPLY STERILE: Performed by: STUDENT IN AN ORGANIZED HEALTH CARE EDUCATION/TRAINING PROGRAM

## 2025-04-18 PROCEDURE — 272N000002 HC OR SUPPLY OTHER OPNP: Performed by: STUDENT IN AN ORGANIZED HEALTH CARE EDUCATION/TRAINING PROGRAM

## 2025-04-18 PROCEDURE — C1762 CONN TISS, HUMAN(INC FASCIA): HCPCS | Performed by: STUDENT IN AN ORGANIZED HEALTH CARE EDUCATION/TRAINING PROGRAM

## 2025-04-18 PROCEDURE — 64447 NJX AA&/STRD FEMORAL NRV IMG: CPT | Mod: LT,XU

## 2025-04-18 DEVICE — IMPLANTABLE DEVICE: Type: IMPLANTABLE DEVICE | Site: KNEE | Status: FUNCTIONAL

## 2025-04-18 DEVICE — GRAFT TENDON TIBIALIS ANTERIOR  W/SPECS 22CM 430336: Type: IMPLANTABLE DEVICE | Site: KNEE | Status: FUNCTIONAL

## 2025-04-18 RX ORDER — LIDOCAINE 40 MG/G
CREAM TOPICAL
Status: DISCONTINUED | OUTPATIENT
Start: 2025-04-18 | End: 2025-04-18 | Stop reason: HOSPADM

## 2025-04-18 RX ORDER — TRANEXAMIC ACID 10 MG/ML
1 INJECTION, SOLUTION INTRAVENOUS ONCE
Status: COMPLETED | OUTPATIENT
Start: 2025-04-18 | End: 2025-04-18

## 2025-04-18 RX ORDER — FENTANYL CITRATE 0.05 MG/ML
25 INJECTION, SOLUTION INTRAMUSCULAR; INTRAVENOUS EVERY 5 MIN PRN
Status: DISCONTINUED | OUTPATIENT
Start: 2025-04-18 | End: 2025-04-18 | Stop reason: HOSPADM

## 2025-04-18 RX ORDER — BUPIVACAINE HYDROCHLORIDE 2.5 MG/ML
INJECTION, SOLUTION INFILTRATION; PERINEURAL PRN
Status: DISCONTINUED | OUTPATIENT
Start: 2025-04-18 | End: 2025-04-18 | Stop reason: HOSPADM

## 2025-04-18 RX ORDER — SODIUM CHLORIDE, SODIUM LACTATE, POTASSIUM CHLORIDE, CALCIUM CHLORIDE 600; 310; 30; 20 MG/100ML; MG/100ML; MG/100ML; MG/100ML
INJECTION, SOLUTION INTRAVENOUS CONTINUOUS
Status: DISCONTINUED | OUTPATIENT
Start: 2025-04-18 | End: 2025-04-18 | Stop reason: HOSPADM

## 2025-04-18 RX ORDER — DEXAMETHASONE SODIUM PHOSPHATE 4 MG/ML
4 INJECTION, SOLUTION INTRA-ARTICULAR; INTRALESIONAL; INTRAMUSCULAR; INTRAVENOUS; SOFT TISSUE
Status: DISCONTINUED | OUTPATIENT
Start: 2025-04-18 | End: 2025-04-18 | Stop reason: HOSPADM

## 2025-04-18 RX ORDER — ONDANSETRON 4 MG/1
4 TABLET, ORALLY DISINTEGRATING ORAL EVERY 30 MIN PRN
Status: DISCONTINUED | OUTPATIENT
Start: 2025-04-18 | End: 2025-04-18 | Stop reason: HOSPADM

## 2025-04-18 RX ORDER — NALOXONE HYDROCHLORIDE 0.4 MG/ML
0.1 INJECTION, SOLUTION INTRAMUSCULAR; INTRAVENOUS; SUBCUTANEOUS
Status: DISCONTINUED | OUTPATIENT
Start: 2025-04-18 | End: 2025-04-18 | Stop reason: HOSPADM

## 2025-04-18 RX ORDER — HYDROMORPHONE HCL IN WATER/PF 6 MG/30 ML
0.4 PATIENT CONTROLLED ANALGESIA SYRINGE INTRAVENOUS EVERY 5 MIN PRN
Status: DISCONTINUED | OUTPATIENT
Start: 2025-04-18 | End: 2025-04-18 | Stop reason: HOSPADM

## 2025-04-18 RX ORDER — CEFAZOLIN SODIUM/WATER 3 G/30 ML
3 SYRINGE (ML) INTRAVENOUS
Status: COMPLETED | OUTPATIENT
Start: 2025-04-18 | End: 2025-04-18

## 2025-04-18 RX ORDER — ONDANSETRON 2 MG/ML
4 INJECTION INTRAMUSCULAR; INTRAVENOUS EVERY 30 MIN PRN
Status: DISCONTINUED | OUTPATIENT
Start: 2025-04-18 | End: 2025-04-18 | Stop reason: HOSPADM

## 2025-04-18 RX ORDER — OXYCODONE HYDROCHLORIDE 5 MG/1
5 TABLET ORAL ONCE
Status: COMPLETED | OUTPATIENT
Start: 2025-04-18 | End: 2025-04-18

## 2025-04-18 RX ORDER — MAGNESIUM HYDROXIDE 1200 MG/15ML
LIQUID ORAL PRN
Status: DISCONTINUED | OUTPATIENT
Start: 2025-04-18 | End: 2025-04-18 | Stop reason: HOSPADM

## 2025-04-18 RX ORDER — CEFAZOLIN SODIUM/WATER 3 G/30 ML
3 SYRINGE (ML) INTRAVENOUS SEE ADMIN INSTRUCTIONS
Status: DISCONTINUED | OUTPATIENT
Start: 2025-04-18 | End: 2025-04-18 | Stop reason: HOSPADM

## 2025-04-18 RX ORDER — HYDROMORPHONE HCL IN WATER/PF 6 MG/30 ML
0.2 PATIENT CONTROLLED ANALGESIA SYRINGE INTRAVENOUS EVERY 5 MIN PRN
Status: DISCONTINUED | OUTPATIENT
Start: 2025-04-18 | End: 2025-04-18 | Stop reason: HOSPADM

## 2025-04-18 RX ORDER — FENTANYL CITRATE 0.05 MG/ML
50 INJECTION, SOLUTION INTRAMUSCULAR; INTRAVENOUS EVERY 5 MIN PRN
Status: DISCONTINUED | OUTPATIENT
Start: 2025-04-18 | End: 2025-04-18 | Stop reason: HOSPADM

## 2025-04-18 RX ADMIN — SODIUM CHLORIDE, POTASSIUM CHLORIDE, SODIUM LACTATE AND CALCIUM CHLORIDE: 600; 310; 30; 20 INJECTION, SOLUTION INTRAVENOUS at 12:03

## 2025-04-18 RX ADMIN — MIDAZOLAM 2 MG: 1 INJECTION INTRAMUSCULAR; INTRAVENOUS at 12:03

## 2025-04-18 RX ADMIN — OXYCODONE HYDROCHLORIDE 5 MG: 5 TABLET ORAL at 16:51

## 2025-04-18 RX ADMIN — FENTANYL CITRATE 50 MCG: 50 INJECTION, SOLUTION INTRAMUSCULAR; INTRAVENOUS at 16:19

## 2025-04-18 RX ADMIN — FENTANYL CITRATE 50 MCG: 50 INJECTION, SOLUTION INTRAMUSCULAR; INTRAVENOUS at 16:29

## 2025-04-18 ASSESSMENT — ACTIVITIES OF DAILY LIVING (ADL)
ADLS_ACUITY_SCORE: 41

## 2025-04-18 NOTE — OP NOTE
Patient name: Clarence Stahl  YOB: 1975  Date of surgery: 04/18/25  [facility]    Preoperative diagnosis:  Left knee complete posterolateral corner injury including the fibular collateral ligament and popliteus tendon injuries  History of prior total knee replacement     Postoperative diagnosis:  As above     Procedure:  Left knee posterolateral corner reconstruction with tibialis anterior allograft using Arciero fibular based technique and peroneal nerve neurolysis     Surgeon:  Felix Spivey MD     First Assistant:  Dawson Spivey MD, PhD  A assistant was available for the surgery and participated to decrease the patient's morbidity by assisting with positioning, manipulation of the limb during the procedure, surgical retraction as necessary, closure of the surgical wound and transferring the patient back to a hospital bed     Anesthesia:  General endotracheal with adductor canal block for postoperative pain     Estimated blood loss:  200 cc     Complications:  None readily apparent     Tourniquet time:  84 minutes    Indications for procedure:  Pt is a pleasant 49-year-old male who underwent a total knee replacement with my partner Dr. Salazar years ago.  He had an acute injury and was noted to have a complete posterolateral corner injury involving his fibular collateral ligament and popliteus tendon based on examination and MRI scan.  He was then referred to our clinic for for further evaluation.  The hope was to avoid requiring a revision to a constrained implant given his BMI of over 50 and young age.      We discussed with the patient and his significant other the options including a posterolateral corner reconstruction.  We discussed that this is intended to replace the 2 torn tendons and that we would have to use a fibular based technique given his prior total knee replacement.  We discussed the risks, benefits, and alternatives of this procedure including the risks  of peroneal nerve injury, persistent pain or instability, arthrofibrosis, DVT, and infection.  We discussed that this would require being on crutches for 6 weeks and he was understanding with this plan.  After this discussion, he did wish to proceed with the above operation.  We discussed that would plan to do this case together in conjunction with Dr. Dawson Spivey given the unusual presentation and his expertise in the posterolateral corner.     Description of procedure:  The patient was met in the preoperative area by myself. Informed consent was obtained as above. Thus, initials were placed on the operative leg indicating the correct operative extremity.  He received a regional anesthetic with our anesthesia team without complication.  The patient was then brought to the operating room where an appropriate anesthetic was induced by the anesthesia service.  He was then placed supine on the operative table and all bony prominences were well-padded and a well-padded thigh-high tourniquet was placed. The left leg was then prepped and draped in sterile fashion. Time-out was called by the surgical team. The correct patient, hospital identification number, laterality and procedure are confirmed.  All in attendance were in agreement.    We then began the procedure with a standard hockey-stick lateral incision for a posterolateral corner reconstruction.  We dissected down to the IT band and exposed this with a Campbell elevator.  We then identified the biceps femoris tendon and beneath this used an adson point point to carefully dissect out the peroneal nerve.  The nerve was approximately twice the size and irritated which required a peroneal nerve neurolysis to minimize the risk of postoperative symptoms to the nerve.  This peroneal nerve neurolysis was extended both proximally for 6 cm and distally into the anterior compartment to ensure that there were no fascial bands or scar tissue remaining around it.     At this  time, we then found our fibular collateral ligament by incising the biceps bursa and placing a tag suture into the FCL.  We then turned our attention to the fibular tunnel of our reconstruction.  We then incised over the anterior aspect of the fibula and found the anatomic insertion of the FCL.  We then dissected posteriorly and found the interval between the lateral gastroc tendon and the soleus muscle and cleared off the posterior aspect of the fibula carefully with a Campbell.  We then utilized our collateral aiming device to drill a Beath pin from anterolateral to posteromedial in the fibula.  This Beath pin was confirmed to be in appropriate position and was followed with a 6 mm reamer.  We then placed a passing suture into this tunnel.     We then proceeded to finding our femoral FCL and popliteus tunnels.  We incised the IT band with a scalpel over the insertion of these tunnels.  Both the FCL and the popliteus were torn off their femoral insertions.  We utilized their femoral insertions as a guide and used our collateral femoral tunnel aiming device to drill Beath pins in each location ensuring that we avoided hitting the prior total knee replacement and ACL metal screw in the femur.  These tunnels were approximately 18 mm apart from each other as expected based on anatomy.  We then drilled each of these with a 6 mm reamer to approximately 25 mm deep and followed this with a 7 mm tap.  We then placed passing sutures in each of these locations.    We then proceeded to preparing and passing our grafts.  On the back table, we utilized a #2 FiberWire on each end in order to suture each end of the graft.  The graft was able to fit through 6 mm tunnels.  Once prepared, we secured this into the FCL femoral tunnel with tension on the graft and a 7 x 20 bio composite [employer] screw.  We then shuttled this graft beneath the IT band and passed it through the fibular tunnel.  With the knee at 30 degrees of flexion and  neutral rotation and the graft under tension we then secured the graft into the fibular tunnel with another 7 x 20 mm bio screw.  This gave us great tension of the FCL reconstruction. We then passed the graft beneath the IT band again and posterior to our first limb and brought this into the popliteus femoral tunnel.  With the knee at 60 degrees of flexion and neutral rotation, we then secured the popliteus into its femoral tunnel with another 7 mm x 20 mm bio screw.  This again gave us great tension of the popliteus tendon.  This completed our fibular base posterolateral corner reconstruction.  The peroneal nerve was again identified and noted to have no injury.    We then sutured the IT band split with running 0 Vicryl.  We then let down the tourniquet and achieved hemostasis.  There was no significant bleeding at this time.  We then thoroughly irrigated the wound with normal saline. We placed a few deep subcutaneous sutures to minimize dead space and then closed the skin with layer closure including 2-0 Monocryl, 3-0 Monocryl and Steri-Strips.  He was then placed into a sterile dressing and a knee immobilizer. The skin was closed with staples and a sterile dressing was placed. The patient was then transferred safely to hospital bed.     Postoperative Plan  Patient will be nonweightbearing on his left lower extremity for 6 weeks.  He will follow a posterolateral corner protocol including 0 to 90 degrees for the first 2 weeks.  He will be in a knee immobilizer except when doing his knee exercises.  He has his first physical therapy appointment on Monday and we will start Lovenox on postoperative day 1 for 2 weeks and then transition to aspirin.  His first postoperative point will be at 2 weeks with me in clinic.

## 2025-04-18 NOTE — BRIEF OP NOTE
[facility]    Brief Operative Note    Pre-operative diagnosis: Knee pain [M25.569]  Complete tear of fibular collateral ligament and popliteus of left knee [S83.422A]   Post-operative diagnosis Same as pre-operative diagnosis    Procedure: LEFT KNEE Posterolateral CORNER RECONSTRUCTION WITH ALLOGRAFT, PERONEAL NERVE NEUROLYSIS, Left - Knee    Surgeon: Surgeons and Role:     * Felix Carter MD - Primary     * Dawson Carter MD - Assisting  Anesthesia: General with Block   Estimated Blood Loss: 200 ml    Drains: None  Specimens: * No specimens in log *  Findings:   None.  Complications: None.  Implants:   Implant Name Type Inv. Item Serial No.  Lot No. LRB No. Used Action   GRAFT TENDON TIBIALIS ANTERIOR  W/SPECS 22 [id number] - [id number] Bone/Tissue/Biologic GRAFT TENDON TIBIALIS ANTERIOR  W/SPECS 22 [id number] MUSCULOSKELETAL OLIVEIRA  Left 1 Implanted   BIOSURE REGENESORB INTERFERENCE SCREW 7MM X 20MM Metallic Hardware/[place]   [employer] [id number] Left 2 Implanted   BIOSURE REGENESORB INTERFERENCE SCREW 7MM X 20MM Metallic Hardware/[place]   [employer] [id number] Left 1 Implanted

## 2025-04-18 NOTE — DISCHARGE INSTRUCTIONS
"**Because you had anesthesia today and your history of sleep apnea, it is extremely important that you use your CPAP machine for the next 24 hours while napping or sleeping.**             Enoxaparin Sodium (Lovenox)  Solution for injection  What is this medicine?  Lovenox is an injectable anti-coagulant (sometimes called a \"blood thinner\") used to prevent blood clots, treat existing blood clots or as a bridge if you've stopped taking your Coumadin for surgery.   What should I tell my health care provider before I take this medicine?  They need to know if you have any of these conditions:  bleeding disorders, hemorrhage, or hemophilia  infection of the heart or heart valves  kidney or liver disease  previous stroke  prosthetic heart valve  recent surgery or delivery of a baby  ulcer in the stomach or intestine, diverticulitis, or other bowel disease  an unusual or allergic reaction to enoxaparin, heparin, pork or pork products, other medicines, foods, dyes, or preservatives  pregnant or trying to get pregnant  breast-feeding  How should I use this medicine?     Enoxaparin should be taken at the same time everyday.  It is injected under the skin into fatty tissue, usually the belly. If you think you have taken too much of this medicine contact a poison control center or emergency room at once.  What if I miss a dose?  If you miss a dose, take it as soon as you can. If it is almost time for your next dose, take only that dose. Do not take double or extra doses.  What may interact with this medicine?  Do not take this medicine with any of the following medications:  aspirin and aspirin-like medicines  heparin  mifepristone  warfarin  This medicine may also interact with the following medications:  cilostazol  clopidogrel  dipyridamole  NSAIDs, medicines for pain and inflammation, like ibuprofen or naproxen  sulfinpyrazone  ticlopidine  This list may not describe all possible interactions. Give your health care provider " a list of all the medicines, herbs, non-prescription drugs, or dietary supplements you use. Also tell them if you smoke, drink alcohol, or use illegal drugs. Some items may interact with your medicine.  Where should I keep my medicine?  Keep out of the reach of children.  Store at room temperature between 15 and 30 F C (59 and 86  F). Do not freeze. If your injections have been specially prepared, you may need to store them in the refrigerator. Ask your pharmacist. Throw away any unused medicine after the expiration date.  How to inject enoxaparin (Lovenox):   1.  Wash your hands.   2.  Gather supplies:  syringe and alcohol wipe.    3.  Remove syringe from package.   4.  Select an area on you belly for injection:    Do not inject within 2 inches of belly button     Each time you inject, switch to the other side of your belly.                          Do not inject into scars, moles tattoos, burns, or bruises.     5.  Clean the area with an alcohol pad and allow to dry.   6.  Remove cap from syringe   7.  Hold syringe like a pencil with your dominant hand, with your other                hand gently pinch the area you cleansed with you thumb and                forefinger.  8.  Insert the needle quickly into the fatty roll at a 90 degree angle, making sure  the needle completely into the skin. Do not let go of the fatty roll until you have                                removed the needle.   9.   Press down slowly on the plunger with your finger until syringe is empty.  10. Remove needle and let go of fatty tissue.  Do not rub the injection site. Press gently on site with the alcohol wipe until it stops bleeding.   11. Press the plunger on the syringe to pop the cover over the needle. Discard into  sharps container.     What side effects may I notice from receiving this medicine?  Side effects that you should report to your doctor or health care professional as soon as possible:  allergic reactions like skin rash,  "itching or hives, swelling of the face, lips, or tongue  black, tarry stools  breathing problems  dark urine  feeling faint or lightheaded, falls  fever  heavy menstrual bleeding  unusual bruising or bleeding                                            [facility]      DISCHARGE INSTRUCTIONS FOLLOWING   REGIONAL BLOCK ANESTHESIA    Numbness or lack of feeling in the arm/leg that was operated may last up to 24 hours.  The average time is usually 10-15 hours.  You may not be able to lift or move the arm or leg where the operation was by itself during that time.  Long-acting local anesthetic medicines were used to give you long-lasting pain relief.  Wear a sling until your arm is completely \"awake\"  Avoid bumping your arm, leg or foot while it is numb  Avoid extremes of hot or cold while it is numb  Remain quiet and restful the day of surgery.  Resume normal activities gradually over the next day or so as advise by your surgeon.  Do not drive or operate  Any machinery until your extremity is full  \"awake\"        You will have a tingling and prickly sensation in your arm/leg as the feeling begins to return; you can also expect some discomfort. The amount of discomfort is unpredictable, but if you have more pain that can be controlled with the pain medication you received, you should contact your surgeon.  Start to take your pain pills as soon as you start to feel any discomfort or pain.                     [facility] Discharge Instructions for  Sedation and General Anesthesia     It's not unusual to feel dizzy, light-headed or faint for up to 24 hours after surgery or while taking pain medication.  If you have these symptoms: sit for a few minutes before standing and have someone assist you when you get up to walk or use the bathroom.    You should rest and relax for the next 24 hours. We recommend you make arrangements to have an adult stay with you for at least 24 hours after your discharge.  Avoid " hazardous and strenuous activity.    DO NOT DRIVE any vehicle or operate mechanical equipment for 24 hours following the end of your surgery.  Even though you may feel normal, your reactions may be affected by the medication you have received.    Do not drink alcoholic beverages for 24 hours following surgery.     Slowly progress to your regular diet as you feel able. It's not unusual to feel nauseated and/or vomit after receiving anesthesia.  If you develop these symptoms, drink clear liquids (apple juice, ginger ale, broth, 7-up, etc. ) until you feel better.  If your nausea and vomiting persists for 24 hours, please notify your surgeon.      All narcotic pain medications, along with inactivity and anesthesia, can cause constipation. Drinking plenty of liquids and increasing fiber intake will help.    For any questions of a medical nature, call your surgeon.    Do not make important decisions for 24 hours.    If you had general anesthesia, you may have a sore throat for a couple of days related to the breathing tube used during surgery.  You may use Cepacol lozenges to help with this discomfort.  If it worsens or if you develop a fever, contact your surgeon.     If you feel your pain is not well managed with the pain medications prescribed by your surgeon, please contact your surgeon's office to let them know so they can address your concerns.      **If you have questions or concerns about your procedure, call   Dr. Carter at [phone].**          Oxycodone 5 mg po given at 4:50 pm

## 2025-04-25 ENCOUNTER — PRE VISIT (OUTPATIENT)
Dept: ENDOCRINOLOGY | Facility: CLINIC | Age: 50
End: 2025-04-25

## 2025-05-31 ENCOUNTER — HEALTH MAINTENANCE LETTER (OUTPATIENT)
Age: 50
End: 2025-05-31

## 2025-07-12 ENCOUNTER — HEALTH MAINTENANCE LETTER (OUTPATIENT)
Age: 50
End: 2025-07-12

## 2025-08-07 ENCOUNTER — OFFICE VISIT (OUTPATIENT)
Dept: FAMILY MEDICINE | Facility: CLINIC | Age: 50
End: 2025-08-07
Payer: COMMERCIAL

## 2025-08-07 VITALS
HEART RATE: 84 BPM | SYSTOLIC BLOOD PRESSURE: 130 MMHG | RESPIRATION RATE: 18 BRPM | OXYGEN SATURATION: 97 % | WEIGHT: 315 LBS | BODY MASS INDEX: 49.68 KG/M2 | TEMPERATURE: 96.9 F | DIASTOLIC BLOOD PRESSURE: 87 MMHG

## 2025-08-07 DIAGNOSIS — S83.203D OTHER TEAR OF MENISCUS OF RIGHT KNEE, UNSPECIFIED MENISCUS, UNSPECIFIED WHETHER OLD OR CURRENT TEAR, SUBSEQUENT ENCOUNTER: ICD-10-CM

## 2025-08-07 DIAGNOSIS — Z01.818 PREOP GENERAL PHYSICAL EXAM: Primary | ICD-10-CM

## 2025-08-07 DIAGNOSIS — E66.813 CLASS 3 SEVERE OBESITY DUE TO EXCESS CALORIES WITH SERIOUS COMORBIDITY AND BODY MASS INDEX (BMI) OF 50.0 TO 59.9 IN ADULT (H): ICD-10-CM

## 2025-08-07 DIAGNOSIS — E11.9 TYPE 2 DIABETES MELLITUS WITHOUT COMPLICATION, WITHOUT LONG-TERM CURRENT USE OF INSULIN (H): ICD-10-CM

## 2025-08-07 DIAGNOSIS — G47.33 OSA (OBSTRUCTIVE SLEEP APNEA): ICD-10-CM

## 2025-08-07 DIAGNOSIS — Z13.6 SCREENING FOR CARDIOVASCULAR CONDITION: ICD-10-CM

## 2025-08-07 LAB
ANION GAP SERPL CALCULATED.3IONS-SCNC: 11 MMOL/L (ref 7–15)
BUN SERPL-MCNC: 16.1 MG/DL (ref 6–20)
CALCIUM SERPL-MCNC: 9.4 MG/DL (ref 8.8–10.4)
CHLORIDE SERPL-SCNC: 104 MMOL/L (ref 98–107)
CREAT SERPL-MCNC: 1.02 MG/DL (ref 0.67–1.17)
EGFRCR SERPLBLD CKD-EPI 2021: 90 ML/MIN/1.73M2
ERYTHROCYTE [DISTWIDTH] IN BLOOD BY AUTOMATED COUNT: 12.3 % (ref 10–15)
EST. AVERAGE GLUCOSE BLD GHB EST-MCNC: 154 MG/DL
GLUCOSE SERPL-MCNC: 139 MG/DL (ref 70–99)
HBA1C MFR BLD: 7 % (ref 0–5.6)
HCO3 SERPL-SCNC: 24 MMOL/L (ref 22–29)
HCT VFR BLD AUTO: 42.6 % (ref 40–53)
HGB BLD-MCNC: 14.9 G/DL (ref 13.3–17.7)
MCH RBC QN AUTO: 29.9 PG (ref 26.5–33)
MCHC RBC AUTO-ENTMCNC: 35 G/DL (ref 31.5–36.5)
MCV RBC AUTO: 86 FL (ref 78–100)
PLATELET # BLD AUTO: 199 10E3/UL (ref 150–450)
POTASSIUM SERPL-SCNC: 4.3 MMOL/L (ref 3.4–5.3)
RBC # BLD AUTO: 4.98 10E6/UL (ref 4.4–5.9)
SODIUM SERPL-SCNC: 139 MMOL/L (ref 135–145)
WBC # BLD AUTO: 8.8 10E3/UL (ref 4–11)

## 2025-08-07 ASSESSMENT — PAIN SCALES - GENERAL: PAINLEVEL_OUTOF10: SEVERE PAIN (9)

## (undated) DEVICE — Device

## (undated) DEVICE — PACK TOTAL KNEE SOP15TKFSD

## (undated) DEVICE — SUTURE STRATAFIX PDS PLUS 1 OS-6 L24 IN VIOLET SXPP1A203

## (undated) DEVICE — DEVICE RETRIEVER HEWSON 71111579

## (undated) DEVICE — SU ETHIBOND 5 V-37 4X30" MB66G

## (undated) DEVICE — NDL 19GA 1.5"

## (undated) DEVICE — BAG DECANTER STERILE WHITE DYNJDEC09

## (undated) DEVICE — PREP POVIDONE-IODINE 10% SOL 3/4OZ POUCH STRL EA-REF885-50

## (undated) DEVICE — SOL WATER IRRIG 1000ML BOTTLE 2F7114

## (undated) DEVICE — LINEN TOWEL PACK X5 5464

## (undated) DEVICE — GOWN IMPERVIOUS SPECIALTY XL/XLONG 39049

## (undated) DEVICE — SU MONOCRYL 3-0 PS-2 27" Y427H

## (undated) DEVICE — SUCTION MANIFOLD NEPTUNE 2 SYS 4 PORT 0702-020-000

## (undated) DEVICE — TOURNIQUET CUFF 34" REPRO BROWN 60-7070-106

## (undated) DEVICE — SU VICRYL 2-0 CT-1 27" UND J259H

## (undated) RX ORDER — BUPIVACAINE HYDROCHLORIDE 2.5 MG/ML
INJECTION, SOLUTION EPIDURAL; INFILTRATION; INTRACAUDAL; PERINEURAL
Status: DISPENSED
Start: 2025-04-18

## (undated) RX ORDER — HYDROMORPHONE HYDROCHLORIDE 1 MG/ML
INJECTION, SOLUTION INTRAMUSCULAR; INTRAVENOUS; SUBCUTANEOUS
Status: DISPENSED
Start: 2025-04-18

## (undated) RX ORDER — FENTANYL CITRATE 0.05 MG/ML
INJECTION, SOLUTION INTRAMUSCULAR; INTRAVENOUS
Status: DISPENSED
Start: 2025-04-18

## (undated) RX ORDER — FENTANYL CITRATE 50 UG/ML
INJECTION, SOLUTION INTRAMUSCULAR; INTRAVENOUS
Status: DISPENSED
Start: 2025-04-18

## (undated) RX ORDER — OXYCODONE HYDROCHLORIDE 5 MG/1
TABLET ORAL
Status: DISPENSED
Start: 2025-04-18